# Patient Record
Sex: FEMALE | Race: WHITE | Employment: UNEMPLOYED | URBAN - METROPOLITAN AREA
[De-identification: names, ages, dates, MRNs, and addresses within clinical notes are randomized per-mention and may not be internally consistent; named-entity substitution may affect disease eponyms.]

---

## 2017-11-11 RX ORDER — NORGESTREL AND ETHINYL ESTRADIOL 0.3-0.03MG
1 KIT ORAL DAILY
Qty: 28 TABLET | Refills: 0 | OUTPATIENT
Start: 2017-11-11

## 2017-11-11 NOTE — TELEPHONE ENCOUNTER
LAST ANNUAL WITH JOVAN ON 11-14-16, LAST PAP 3-19-14 AND LAST MAMMO 12-9-15. PT DID NOT DO MAMMO LAST YEAR. NEXT ANNUAL SCHEDULED WITH CAP ON 1-23-18. SENT BACK RX DENIED, HAVE PT CALL THE OFFICE.

## 2017-11-21 NOTE — TELEPHONE ENCOUNTER
LAST ANNUAL WITH JOVAN ON 11-14-16, LAST PAP 3-19-14, LAST MAMMO 12-9-15 (DID NOT DO MAMMO ORDERED AT LAST EXAM) AND NEXT ANNUAL IS SCHEDULED 1-23-18. RX PENDED TO [facility]AN FOR 3 TOTAL REFILLS SINCE PT DID NOT DO MAMMO.

## 2017-11-24 RX ORDER — NORGESTREL AND ETHINYL ESTRADIOL 0.3-0.03MG
1 KIT ORAL DAILY
Qty: 28 TABLET | Refills: 2 | Status: SHIPPED | OUTPATIENT
Start: 2017-11-24 | End: 2018-01-23

## 2018-01-23 ENCOUNTER — OFFICE VISIT (OUTPATIENT)
Dept: OBGYN CLINIC | Facility: CLINIC | Age: 43
End: 2018-01-23

## 2018-01-23 VITALS
DIASTOLIC BLOOD PRESSURE: 82 MMHG | HEART RATE: 84 BPM | BODY MASS INDEX: 46.44 KG/M2 | SYSTOLIC BLOOD PRESSURE: 135 MMHG | WEIGHT: 246 LBS | HEIGHT: 61 IN

## 2018-01-23 DIAGNOSIS — N39.498 OTHER URINARY INCONTINENCE: ICD-10-CM

## 2018-01-23 DIAGNOSIS — Z12.31 VISIT FOR SCREENING MAMMOGRAM: ICD-10-CM

## 2018-01-23 DIAGNOSIS — Z01.419 ENCOUNTER FOR GYNECOLOGICAL EXAMINATION WITHOUT ABNORMAL FINDING: Primary | ICD-10-CM

## 2018-01-23 PROCEDURE — 99213 OFFICE O/P EST LOW 20 MIN: CPT | Performed by: OBSTETRICS & GYNECOLOGY

## 2018-01-23 PROCEDURE — 99396 PREV VISIT EST AGE 40-64: CPT | Performed by: OBSTETRICS & GYNECOLOGY

## 2018-01-23 NOTE — PROGRESS NOTES
Paramjit Cain is a 43year old female [id number] Patient's last menstrual period was 01/08/2018. Patient presents with:  Gyn Exam: ANNUAL EXAM / Meena Burgess  pt c/o spontaneous leakage of urine as she moves around. She soaks 2 pads per day.   I rec avoiding status: Never Smoker    Smokeless tobacco: Never Used    Alcohol use Yes  0.0 oz/week     Comment: wine, socially    Drug use: No    Comment: NONE    Sexual activity: Yes    Birth control/ protection: OCP     Other Topics Concern    Caffeine Concern Yes or bleeding.       PHYSICAL EXAM:   Constitutional: well developed, well nourished  Head/Face: normocephalic  Neck/Thyroid: thyroid symmetric, no thyromegaly, no nodules, no adenopathy  Lymphatic:no abnormal supraclavicular or axillary adenopathy is noted

## 2018-01-24 LAB — HPV I/H RISK 1 DNA SPEC QL NAA+PROBE: NEGATIVE

## 2018-04-05 ENCOUNTER — OFFICE VISIT (OUTPATIENT)
Dept: INTERNAL MEDICINE CLINIC | Facility: CLINIC | Age: 43
End: 2018-04-05

## 2018-04-05 VITALS
WEIGHT: 252 LBS | HEIGHT: 61 IN | HEART RATE: 65 BPM | SYSTOLIC BLOOD PRESSURE: 134 MMHG | DIASTOLIC BLOOD PRESSURE: 82 MMHG | BODY MASS INDEX: 47.58 KG/M2 | RESPIRATION RATE: 16 BRPM

## 2018-04-05 DIAGNOSIS — Z00.00 PHYSICAL EXAM, ANNUAL: Primary | ICD-10-CM

## 2018-04-05 PROCEDURE — 99396 PREV VISIT EST AGE 40-64: CPT | Performed by: INTERNAL MEDICINE

## 2018-04-05 NOTE — PROGRESS NOTES
HPI:   Reddy Gillette is a 43year old female who presents for a complete physical exam. Symptoms: periods are regular. Patient complains of nothing.       Immunization History  Administered            Date(s) Administered    Influenza             11/19/20 Maternal Uncle       Social History:   Smoking status: Never Smoker                                                              Smokeless tobacco: Never Used                      Alcohol use: Yes           0.0 oz/week     Comment: wine, socially    Occ: h complete physical exam.  Order put in for mammogram and dexascan. Self breast exam explained. Health maintenance, will check fasting Lipids, CMP, TSH and CBC. Pt referred for screening colonoscopy at 47 y/o.  Pt info: exercise, low fat diet and breast self-

## 2019-01-14 ENCOUNTER — TELEPHONE (OUTPATIENT)
Dept: OBGYN CLINIC | Facility: CLINIC | Age: 44
End: 2019-01-14

## 2019-01-14 NOTE — TELEPHONE ENCOUNTER
Pt ran out of [facility] was supposed to start yesterday.  Pt wants to get one month BC has an apt with CAP 1/31

## 2019-01-14 NOTE — TELEPHONE ENCOUNTER
1 pack of OCP sent to pts pharmacy per protocol to cover pt until her annual. Pt verbalized understanding.

## 2019-02-12 ENCOUNTER — OFFICE VISIT (OUTPATIENT)
Dept: OBGYN CLINIC | Facility: CLINIC | Age: 44
End: 2019-02-12
Payer: COMMERCIAL

## 2019-02-12 VITALS
BODY MASS INDEX: 48 KG/M2 | HEART RATE: 92 BPM | WEIGHT: 254 LBS | DIASTOLIC BLOOD PRESSURE: 85 MMHG | SYSTOLIC BLOOD PRESSURE: 159 MMHG

## 2019-02-12 DIAGNOSIS — Z01.419 ENCOUNTER FOR GYNECOLOGICAL EXAMINATION WITHOUT ABNORMAL FINDING: Primary | ICD-10-CM

## 2019-02-12 DIAGNOSIS — Z12.31 VISIT FOR SCREENING MAMMOGRAM: ICD-10-CM

## 2019-02-12 DIAGNOSIS — R03.0 ELEVATED BLOOD-PRESSURE READING WITHOUT DIAGNOSIS OF HYPERTENSION: ICD-10-CM

## 2019-02-12 DIAGNOSIS — N39.3 STRESS INCONTINENCE OF URINE: ICD-10-CM

## 2019-02-12 PROCEDURE — 99396 PREV VISIT EST AGE 40-64: CPT | Performed by: OBSTETRICS & GYNECOLOGY

## 2019-02-12 NOTE — PROGRESS NOTES
Ricky Rojo is a 37year old female [id number] Patient's last menstrual period was 02/08/2019. Patient presents with:  Gyn Exam: ANNUAL    She did not go to see gyne urology as recommended last year for incontinence- she asked for the card again today. and Sexual Activity      Alcohol use:  Yes        Alcohol/week: 0.0 oz        Comment: wine, socially      Drug use: No        Comment: NONE      Sexual activity: Yes        Birth control/protection: OCP    Other Topics      Concerns:        Selestino Schwab denies chest pain or palpitations  Respiratory:  denies shortness of breath  Gastrointestinal:  denies heartburn, abdominal pain, diarrhea or constipation  Genitourinary:  denies dysuria, incontinence, abnormal vaginal discharge, vaginal itching  Musculosk diagnosis of hypertension  Visit for screening mammogram  ASCCP guidelines discussed,cotest done 2018-neg - repeat in 3 years ,rtc 1 year for annual exam    No orders of the defined types were placed in this encounter.       Requested Prescriptions      No

## 2019-02-15 ENCOUNTER — WALK IN (OUTPATIENT)
Dept: URGENT CARE | Age: 44
End: 2019-02-15

## 2019-02-15 VITALS
TEMPERATURE: 97.3 F | RESPIRATION RATE: 17 BRPM | OXYGEN SATURATION: 96 % | HEART RATE: 82 BPM | DIASTOLIC BLOOD PRESSURE: 88 MMHG | BODY MASS INDEX: 46.01 KG/M2 | HEIGHT: 62 IN | WEIGHT: 250 LBS | SYSTOLIC BLOOD PRESSURE: 132 MMHG

## 2019-02-15 DIAGNOSIS — N30.00 ACUTE CYSTITIS WITHOUT HEMATURIA: Primary | ICD-10-CM

## 2019-02-15 LAB
APPEARANCE, POC: ABNORMAL
BILIRUBIN, POC: NEGATIVE
COLOR, POC: ABNORMAL
GLUCOSE UR-MCNC: NEGATIVE MG/DL
KETONES, POC: NEGATIVE
NITRITE, POC: NEGATIVE
OCCULT BLOOD, POC: ABNORMAL
PH UR: 6 [PH] (ref 5–7)
PROT UR-MCNC: NEGATIVE G/DL
SP GR UR: 1.02 (ref 1–1.03)
UROBILINOGEN UR-MCNC: 0.2 MG/DL (ref 0–1)
WBC (LEUKOCYTE) ESTERASE, POC: ABNORMAL

## 2019-02-15 PROCEDURE — 81002 URINALYSIS NONAUTO W/O SCOPE: CPT | Performed by: NURSE PRACTITIONER

## 2019-02-15 PROCEDURE — 99203 OFFICE O/P NEW LOW 30 MIN: CPT | Performed by: NURSE PRACTITIONER

## 2019-02-15 RX ORDER — NITROFURANTOIN 25; 75 MG/1; MG/1
100 CAPSULE ORAL 2 TIMES DAILY
Qty: 10 CAPSULE | Refills: 0 | Status: SHIPPED | OUTPATIENT
Start: 2019-02-15 | End: 2019-02-20

## 2019-02-15 RX ORDER — FLUCONAZOLE 150 MG/1
150 TABLET ORAL ONCE
Qty: 1 TABLET | Refills: 0 | Status: SHIPPED | OUTPATIENT
Start: 2019-02-15 | End: 2019-02-15

## 2019-04-08 RX ORDER — NORGESTREL AND ETHINYL ESTRADIOL 0.3-0.03MG
KIT ORAL
Qty: 84 TABLET | Refills: 0 | OUTPATIENT
Start: 2019-04-08

## 2019-05-01 ENCOUNTER — HOSPITAL ENCOUNTER (OUTPATIENT)
Dept: MAMMOGRAPHY | Facility: HOSPITAL | Age: 44
Discharge: HOME | End: 2019-05-01
Attending: OBSTETRICS & GYNECOLOGY
Payer: COMMERCIAL

## 2019-05-01 DIAGNOSIS — Z12.31 VISIT FOR SCREENING MAMMOGRAM: ICD-10-CM

## 2019-05-01 PROCEDURE — 77067 SCR MAMMO BI INCL CAD: CPT | Performed by: OBSTETRICS & GYNECOLOGY

## 2019-05-01 PROCEDURE — 77063 BREAST TOMOSYNTHESIS BI: CPT | Performed by: OBSTETRICS & GYNECOLOGY

## 2019-05-06 NOTE — TELEPHONE ENCOUNTER
HAD ANNUAL EXAM 2-12-19, LAST PAP 1-23-18 AND LAST MAMMO 5-1-19. RX WAS NOT GIVEN AT [facility]. RX PENDED TO [facility] FOR REFILLS TIL FEB. 2020. PER PT, SHE WAS SUPPOSED TO START A PILL PACK YESTERDAY.

## 2019-05-06 NOTE — TELEPHONE ENCOUNTER
Pt requesting refill be sent to pharmacy, states she was seen in February and has been calling every month to get refill.

## 2019-05-07 NOTE — TELEPHONE ENCOUNTER
MESSAGE REVIEWED WITH CAP IN THE OFFICE. DUE TO PT'S BLOOD PRESSURE AT APPT IN FEB. SHE WILL NOT REFILL OC'S AT THIS TIME. PT NEEDS TO ADDRESS HER BP WITH HER PCP. PT NOTIFIED OF THIS INFO.   PT WAS CERTAIN CAP TOLD HER SHE WOULD REFILL BUT PT NEEDED TO

## 2019-05-09 ENCOUNTER — OFFICE VISIT (OUTPATIENT)
Dept: INTERNAL MEDICINE CLINIC | Facility: CLINIC | Age: 44
End: 2019-05-09
Payer: COMMERCIAL

## 2019-05-09 ENCOUNTER — TELEPHONE (OUTPATIENT)
Dept: INTERNAL MEDICINE CLINIC | Facility: CLINIC | Age: 44
End: 2019-05-09

## 2019-05-09 VITALS
SYSTOLIC BLOOD PRESSURE: 150 MMHG | DIASTOLIC BLOOD PRESSURE: 90 MMHG | TEMPERATURE: 98 F | BODY MASS INDEX: 48.27 KG/M2 | HEIGHT: 61 IN | HEART RATE: 92 BPM | WEIGHT: 255.69 LBS

## 2019-05-09 DIAGNOSIS — R53.83 FATIGUE, UNSPECIFIED TYPE: ICD-10-CM

## 2019-05-09 DIAGNOSIS — E66.01 MORBID OBESITY WITH BMI OF 45.0-49.9, ADULT (HCC): ICD-10-CM

## 2019-05-09 DIAGNOSIS — I10 ESSENTIAL HYPERTENSION: Primary | ICD-10-CM

## 2019-05-09 DIAGNOSIS — Z00.00 PHYSICAL EXAM, ANNUAL: Primary | ICD-10-CM

## 2019-05-09 DIAGNOSIS — E55.9 VITAMIN D DEFICIENCY: ICD-10-CM

## 2019-05-09 PROCEDURE — 99214 OFFICE O/P EST MOD 30 MIN: CPT | Performed by: INTERNAL MEDICINE

## 2019-05-09 PROCEDURE — 99212 OFFICE O/P EST SF 10 MIN: CPT | Performed by: INTERNAL MEDICINE

## 2019-05-09 RX ORDER — AMLODIPINE BESYLATE 2.5 MG/1
2.5 TABLET ORAL DAILY
Qty: 30 TABLET | Refills: 1 | Status: SHIPPED | OUTPATIENT
Start: 2019-05-09 | End: 2019-08-12

## 2019-05-09 NOTE — ASSESSMENT & PLAN NOTE
Vitamin D level of 22 in 2016. Advised start taking over-the-counter vitamin D3 supplements 2000 units daily. Ordered labs.

## 2019-05-09 NOTE — PROGRESS NOTES
Juan Carlos Vivas is a 37year old female. Patient presents with:  Blood Pressure      HPI:     HPI  Patient is here for elevated BP. Was seen by her OB/GYN-Dr. leone in February. Her blood pressure at that time was 159/85.   Her blood pressure today is 150 Alcohol use: Yes      Alcohol/week: 0.0 oz      Comment: wine, socially    Drug use: No      Comment: NONE       REVIEW OF SYSTEMS:   Review of Systems   Constitutional: Positive for malaise/fatigue. Weight gain   Eyes: Negative for blurred vision. blood pressure and obesity. Patient defers testing now, wants to try lifestyle modification and weight loss first.  Complications of untreated hypertension explained especially heart failure, organ damage, increased risk of heart attack and stroke.   Patie with the patient and she is motivated to try hard to lose weight. Relevant Orders    DIETITIAN EDUCATION INITIAL, DIET (INTERNAL)        rtc in 3 w for physical        The patient indicates understanding of these issues and agrees to the plan.

## 2019-05-09 NOTE — ASSESSMENT & PLAN NOTE
Most likely due to multifactorial etiology. Possible underlying sleep apnea-(patient defers testing currently), vitamin D deficiency, possible underlying depression, weight gain, inactivity. Ordered basic labs.   Advised the patient to be more active and

## 2019-05-09 NOTE — ASSESSMENT & PLAN NOTE
BMI of 48. Patient not interested in medication weight loss. Likes to do it more naturally. Referral for dietitian given. Motivated to lose weight. Importance of weight loss discussed. Increased risk of sleep apnea with morbid obesity discussed.   Virgei Angelo

## 2019-05-09 NOTE — ASSESSMENT & PLAN NOTE
Blood pressure elevated in 2 separate occasions in 907J of systolic and 90 of diastolic. Lifestyle modification discussed. Counseled on weight loss, low-salt diet, regular exercise at least 30 minutes of aerobic exercise 5 times a week.   Ne rivas

## 2019-06-20 ENCOUNTER — WALK IN (OUTPATIENT)
Dept: URGENT CARE | Age: 44
End: 2019-06-20

## 2019-06-20 ENCOUNTER — TELEPHONE (OUTPATIENT)
Dept: SCHEDULING | Age: 44
End: 2019-06-20

## 2019-06-20 VITALS
SYSTOLIC BLOOD PRESSURE: 118 MMHG | HEIGHT: 61 IN | RESPIRATION RATE: 16 BRPM | TEMPERATURE: 98 F | HEART RATE: 74 BPM | DIASTOLIC BLOOD PRESSURE: 84 MMHG | OXYGEN SATURATION: 98 % | BODY MASS INDEX: 47.2 KG/M2 | WEIGHT: 250 LBS

## 2019-06-20 DIAGNOSIS — N30.01 ACUTE CYSTITIS WITH HEMATURIA: ICD-10-CM

## 2019-06-20 DIAGNOSIS — R39.89 SENSATION OF PRESSURE IN BLADDER AREA: Primary | ICD-10-CM

## 2019-06-20 LAB
APPEARANCE, POC: NORMAL
BILIRUBIN, POC: NEGATIVE
COLOR, POC: YELLOW
GLUCOSE UR-MCNC: NEGATIVE MG/DL
KETONES, POC: NEGATIVE
NITRITE, POC: NEGATIVE
OCCULT BLOOD, POC: NORMAL
PH UR: 6 [PH] (ref 5–7)
PROT UR-MCNC: NEGATIVE G/DL
SP GR UR: 1.02 (ref 1–1.03)
UROBILINOGEN UR-MCNC: 0.2 MG/DL (ref 0–1)
WBC (LEUKOCYTE) ESTERASE, POC: NORMAL

## 2019-06-20 PROCEDURE — 81002 URINALYSIS NONAUTO W/O SCOPE: CPT | Performed by: NURSE PRACTITIONER

## 2019-06-20 PROCEDURE — 99214 OFFICE O/P EST MOD 30 MIN: CPT | Performed by: NURSE PRACTITIONER

## 2019-06-20 RX ORDER — AMLODIPINE BESYLATE 2.5 MG/1
2.5 TABLET ORAL
COMMUNITY
Start: 2019-05-09

## 2019-06-20 RX ORDER — NITROFURANTOIN 25; 75 MG/1; MG/1
100 CAPSULE ORAL 2 TIMES DAILY
Qty: 10 CAPSULE | Refills: 0 | Status: SHIPPED | OUTPATIENT
Start: 2019-06-20

## 2019-08-12 RX ORDER — AMLODIPINE BESYLATE 2.5 MG/1
2.5 TABLET ORAL DAILY
Qty: 30 TABLET | Refills: 1 | Status: SHIPPED | OUTPATIENT
Start: 2019-08-12 | End: 2020-08-19

## 2019-08-12 NOTE — TELEPHONE ENCOUNTER
Please review; protocol failed. No recent CMP on file.       Per 5/9/19 Amlodipine started by Dr Araceli Santos with pt who states \"I didn't get a chance to f/u with Dr Fatoumata Jones, but I have an OV scheduled with Dr Roddy Zabala 8/29/19 to f/u\"    Pt states she 0.4 03/03/2016    TP 7.4 03/03/2016    ALB 3.6 03/03/2016     03/03/2016    K 4.6 03/03/2016     03/03/2016    CO2 27 03/03/2016    GLOBULIN 3.8 (H) 03/03/2016    AGRATIO 0.9 (L) 03/03/2016    ANIONGAP 7 03/03/2016    OSMOCALC 289 03/03/2016

## 2019-08-12 NOTE — TELEPHONE ENCOUNTER
Pt called in requesting refill on medication below. She states that she has been out of it for about 1 month. She would like to get back on the medication and then come in for a follow up with Dr. Joni Perez. Please advise.      Current Outpatient Medications:

## 2019-08-23 ENCOUNTER — LAB ENCOUNTER (OUTPATIENT)
Dept: LAB | Facility: HOSPITAL | Age: 44
End: 2019-08-23
Attending: INTERNAL MEDICINE
Payer: COMMERCIAL

## 2019-08-23 DIAGNOSIS — Z00.00 PHYSICAL EXAM, ANNUAL: ICD-10-CM

## 2019-08-23 LAB
25(OH)D3 SERPL-MCNC: 22.6 NG/ML (ref 30–100)
ALBUMIN SERPL-MCNC: 3.5 G/DL (ref 3.4–5)
ALBUMIN/GLOB SERPL: 0.9 {RATIO} (ref 1–2)
ALP LIVER SERPL-CCNC: 94 U/L (ref 37–98)
ALT SERPL-CCNC: 52 U/L (ref 13–56)
ANION GAP SERPL CALC-SCNC: 8 MMOL/L (ref 0–18)
AST SERPL-CCNC: 30 U/L (ref 15–37)
BASOPHILS # BLD AUTO: 0.09 X10(3) UL (ref 0–0.2)
BASOPHILS NFR BLD AUTO: 1.2 %
BILIRUB SERPL-MCNC: 0.5 MG/DL (ref 0.1–2)
BUN BLD-MCNC: 12 MG/DL (ref 7–18)
BUN/CREAT SERPL: 16.2 (ref 10–20)
CALCIUM BLD-MCNC: 9 MG/DL (ref 8.5–10.1)
CHLORIDE SERPL-SCNC: 106 MMOL/L (ref 98–112)
CHOLEST SMN-MCNC: 174 MG/DL (ref ?–200)
CO2 SERPL-SCNC: 28 MMOL/L (ref 21–32)
CREAT BLD-MCNC: 0.74 MG/DL (ref 0.55–1.02)
DEPRECATED RDW RBC AUTO: 43.4 FL (ref 35.1–46.3)
EOSINOPHIL # BLD AUTO: 0.22 X10(3) UL (ref 0–0.7)
EOSINOPHIL NFR BLD AUTO: 2.9 %
ERYTHROCYTE [DISTWIDTH] IN BLOOD BY AUTOMATED COUNT: 13.3 % (ref 11–15)
GLOBULIN PLAS-MCNC: 4 G/DL (ref 2.8–4.4)
GLUCOSE BLD-MCNC: 92 MG/DL (ref 70–99)
HCT VFR BLD AUTO: 38.4 % (ref 35–48)
HDLC SERPL-MCNC: 45 MG/DL (ref 40–59)
HGB BLD-MCNC: 12.6 G/DL (ref 12–16)
IMM GRANULOCYTES # BLD AUTO: 0.02 X10(3) UL (ref 0–1)
IMM GRANULOCYTES NFR BLD: 0.3 %
LDLC SERPL CALC-MCNC: 105 MG/DL (ref ?–100)
LYMPHOCYTES # BLD AUTO: 2.49 X10(3) UL (ref 1–4)
LYMPHOCYTES NFR BLD AUTO: 33.3 %
M PROTEIN MFR SERPL ELPH: 7.5 G/DL (ref 6.4–8.2)
MCH RBC QN AUTO: 29.4 PG (ref 26–34)
MCHC RBC AUTO-ENTMCNC: 32.8 G/DL (ref 31–37)
MCV RBC AUTO: 89.5 FL (ref 80–100)
MONOCYTES # BLD AUTO: 0.61 X10(3) UL (ref 0.1–1)
MONOCYTES NFR BLD AUTO: 8.2 %
NEUTROPHILS # BLD AUTO: 4.05 X10 (3) UL (ref 1.5–7.7)
NEUTROPHILS # BLD AUTO: 4.05 X10(3) UL (ref 1.5–7.7)
NEUTROPHILS NFR BLD AUTO: 54.1 %
NONHDLC SERPL-MCNC: 129 MG/DL (ref ?–130)
OSMOLALITY SERPL CALC.SUM OF ELEC: 293 MOSM/KG (ref 275–295)
PATIENT FASTING: YES
PATIENT FASTING: YES
PLATELET # BLD AUTO: 342 10(3)UL (ref 150–450)
POTASSIUM SERPL-SCNC: 4.1 MMOL/L (ref 3.5–5.1)
RBC # BLD AUTO: 4.29 X10(6)UL (ref 3.8–5.3)
SODIUM SERPL-SCNC: 142 MMOL/L (ref 136–145)
TRIGL SERPL-MCNC: 119 MG/DL (ref 30–149)
TSI SER-ACNC: 2.28 MIU/ML (ref 0.36–3.74)
VLDLC SERPL CALC-MCNC: 24 MG/DL (ref 0–30)
WBC # BLD AUTO: 7.5 X10(3) UL (ref 4–11)

## 2019-08-23 PROCEDURE — 85025 COMPLETE CBC W/AUTO DIFF WBC: CPT

## 2019-08-23 PROCEDURE — 80053 COMPREHEN METABOLIC PANEL: CPT

## 2019-08-23 PROCEDURE — 82306 VITAMIN D 25 HYDROXY: CPT

## 2019-08-23 PROCEDURE — 36415 COLL VENOUS BLD VENIPUNCTURE: CPT

## 2019-08-23 PROCEDURE — 80061 LIPID PANEL: CPT

## 2019-08-23 PROCEDURE — 84443 ASSAY THYROID STIM HORMONE: CPT

## 2019-08-29 ENCOUNTER — OFFICE VISIT (OUTPATIENT)
Dept: INTERNAL MEDICINE CLINIC | Facility: CLINIC | Age: 44
End: 2019-08-29
Payer: COMMERCIAL

## 2019-08-29 VITALS
DIASTOLIC BLOOD PRESSURE: 84 MMHG | WEIGHT: 262 LBS | HEART RATE: 80 BPM | SYSTOLIC BLOOD PRESSURE: 143 MMHG | BODY MASS INDEX: 49.47 KG/M2 | HEIGHT: 61 IN | RESPIRATION RATE: 16 BRPM

## 2019-08-29 DIAGNOSIS — Z00.00 PHYSICAL EXAM, ANNUAL: Primary | ICD-10-CM

## 2019-08-29 DIAGNOSIS — R06.83 SNORING: ICD-10-CM

## 2019-08-29 PROCEDURE — 99396 PREV VISIT EST AGE 40-64: CPT | Performed by: INTERNAL MEDICINE

## 2019-08-29 NOTE — PROGRESS NOTES
HPI:   Tree Coker is a 40year old female who presents for a complete physical exam. Symptoms: periods are regular. Patient complains of nothing.       Immunization History  Administered            Date(s) Administered    Influenza             11/19/20 Maternal Grandfather    • Heart Disease Maternal Grandfather         CAD   • Hypertension Sister    • Obesity Sister    • Hypertension Brother    • Hypertension Brother    • Asthma Sister    • Other (Other) Brother         gout   • Cancer Paternal Latrice Dillon edema/ spider veins on legs. NEURO: Oriented times three,cranial nerves are intact,motor and sensory are grossly intact    ASSESSMENT AND PLAN:   1.  Physical exam, annual    Alfredo Devries is a 43year old female who presents for a complete physical exam

## 2019-10-04 ENCOUNTER — TELEPHONE (OUTPATIENT)
Dept: INTERNAL MEDICINE CLINIC | Facility: CLINIC | Age: 44
End: 2019-10-04

## 2019-10-04 NOTE — TELEPHONE ENCOUNTER
Patient states started first started taking Amlodipine in May but \"not on a regular basis. \" When patient was on med she developed cough, sob and pressure on her chest.  Once off of med patient stated \"I felt so much better. \"    Patient states she does

## 2019-10-04 NOTE — TELEPHONE ENCOUNTER
Patient stated she stopped taking amLODIPine Besylate 2.5 MG Oral Tab (3-4 weeks ago) because she was feeling the side effects (shortness of breath and heavy wheezing). Patient wants to know if she needs to take another high blood pressure medication.

## 2019-10-07 NOTE — TELEPHONE ENCOUNTER
Pt was called and inform of Dr. Bliss [place] message below and she verbalized understanding. She will try to get her sister b/p machine.

## 2019-10-10 ENCOUNTER — OFFICE VISIT (OUTPATIENT)
Dept: SLEEP CENTER | Age: 44
End: 2019-10-10
Attending: INTERNAL MEDICINE
Payer: COMMERCIAL

## 2019-10-10 PROCEDURE — 95806 SLEEP STUDY UNATT&RESP EFFT: CPT

## 2019-11-30 ENCOUNTER — WALK IN (OUTPATIENT)
Dept: URGENT CARE | Age: 44
End: 2019-11-30

## 2019-11-30 DIAGNOSIS — N30.00 ACUTE CYSTITIS WITHOUT HEMATURIA: Primary | ICD-10-CM

## 2019-11-30 LAB
APPEARANCE, POC: CLEAR
BILIRUBIN, POC: NEGATIVE
COLOR, POC: YELLOW
GLUCOSE UR-MCNC: NEGATIVE MG/DL
KETONES, POC: NEGATIVE
NITRITE, POC: NEGATIVE
OCCULT BLOOD, POC: NEGATIVE
PH UR: 6.5 [PH] (ref 5–7)
PROT UR-MCNC: NEGATIVE G/DL
SP GR UR: 1.01 (ref 1–1.03)
UROBILINOGEN UR-MCNC: 0.2 MG/DL (ref 0–1)
WBC (LEUKOCYTE) ESTERASE, POC: NORMAL

## 2019-11-30 PROCEDURE — 81002 URINALYSIS NONAUTO W/O SCOPE: CPT | Performed by: NURSE PRACTITIONER

## 2019-11-30 PROCEDURE — 99202 OFFICE O/P NEW SF 15 MIN: CPT | Performed by: NURSE PRACTITIONER

## 2019-11-30 PROCEDURE — 87086 URINE CULTURE/COLONY COUNT: CPT | Performed by: NURSE PRACTITIONER

## 2019-11-30 RX ORDER — NITROFURANTOIN 25; 75 MG/1; MG/1
100 CAPSULE ORAL 2 TIMES DAILY
Qty: 10 CAPSULE | Refills: 0 | Status: SHIPPED | OUTPATIENT
Start: 2019-11-30 | End: 2019-12-05

## 2019-11-30 ASSESSMENT — ENCOUNTER SYMPTOMS
ABDOMINAL PAIN: 0
EYE REDNESS: 0
DIARRHEA: 0
FEVER: 0
BACK PAIN: 0
NAUSEA: 0
VOMITING: 0

## 2019-11-30 ASSESSMENT — PAIN SCALES - GENERAL: PAINLEVEL: 5-6

## 2019-12-02 LAB
BACTERIA UR CULT: NORMAL
REPORT STATUS (RPT): NORMAL
SPECIMEN SOURCE: NORMAL

## 2019-12-03 ENCOUNTER — TELEPHONE (OUTPATIENT)
Dept: SCHEDULING | Age: 44
End: 2019-12-03

## 2019-12-03 ENCOUNTER — TELEPHONE (OUTPATIENT)
Dept: URGENT CARE | Age: 44
End: 2019-12-03

## 2020-07-18 ENCOUNTER — TELEPHONE (OUTPATIENT)
Dept: INTERNAL MEDICINE CLINIC | Facility: CLINIC | Age: 45
End: 2020-07-18

## 2020-07-18 NOTE — TELEPHONE ENCOUNTER
Patient states her allergies have been acting up for past few months and still has cough, stuffy nose and wheezing. Denies difficulty breathing or chest pain. Patient speaking in complete sentences on phone call.     Patient would also like to discuss her

## 2020-07-20 ENCOUNTER — TELEMEDICINE (OUTPATIENT)
Dept: INTERNAL MEDICINE CLINIC | Facility: CLINIC | Age: 45
End: 2020-07-20

## 2020-07-20 DIAGNOSIS — E55.9 VITAMIN D DEFICIENCY: ICD-10-CM

## 2020-07-20 DIAGNOSIS — I10 ESSENTIAL HYPERTENSION WITH GOAL BLOOD PRESSURE LESS THAN 140/90: Primary | ICD-10-CM

## 2020-07-20 PROCEDURE — 99214 OFFICE O/P EST MOD 30 MIN: CPT | Performed by: INTERNAL MEDICINE

## 2020-08-17 ENCOUNTER — NURSE TRIAGE (OUTPATIENT)
Dept: INTERNAL MEDICINE CLINIC | Facility: CLINIC | Age: 45
End: 2020-08-17

## 2020-08-17 NOTE — TELEPHONE ENCOUNTER
----- Message from Maddy Iyer sent at 8/17/2020  2:23 PM CDT -----  Regarding: Other  Contact: [phone]  April Lion,    Sorry I didn’t get back to you sooner regarding my blood pressure and I didn’t do it everyday but I do have 5 days wo

## 2020-08-18 NOTE — TELEPHONE ENCOUNTER
Pt was called and she was informed of Farida message below. Telephone visit was made for tomorrow.       Future Appointments   Date Time Provider Ronald Bravo   8/19/2020  4:10 PM Jenna Carter MD [facility] Rosamaria Agudelo

## 2020-08-19 ENCOUNTER — VIRTUAL PHONE E/M (OUTPATIENT)
Dept: INTERNAL MEDICINE CLINIC | Facility: CLINIC | Age: 45
End: 2020-08-19
Payer: COMMERCIAL

## 2020-08-19 DIAGNOSIS — I10 ESSENTIAL HYPERTENSION WITH GOAL BLOOD PRESSURE LESS THAN 140/90: Primary | ICD-10-CM

## 2020-08-19 PROCEDURE — 99214 OFFICE O/P EST MOD 30 MIN: CPT | Performed by: INTERNAL MEDICINE

## 2020-08-19 RX ORDER — AMLODIPINE BESYLATE 2.5 MG/1
2.5 TABLET ORAL DAILY
Qty: 90 TABLET | Refills: 1 | Status: SHIPPED | OUTPATIENT
Start: 2020-08-19 | End: 2021-03-15

## 2020-08-20 NOTE — PROGRESS NOTES
Patient ID: Nader Villa is a 39year old female. No chief complaint on file. Virtual/Telephone Check-In    Nader Villa verbally consents to a Virtual/Telephone Check-In service on 08/20/20.  Patient understands and accepts financial responsib activity: Yes        Birth control/protection: OCP    Lifestyle      Physical activity:        Days per week: Not on file        Minutes per session: Not on file      Stress: Not on file    Relationships      Social connections:        Talks on phone: Not minutes   Telephone time 11 minutes   Documentation time 3 minutes     Gina Lara understands phone evaluation is not a substitute for face-to-face examination or emergency care.  Patient advised to go to ER or call 911 for worsening symptoms or acute

## 2020-09-17 ENCOUNTER — TELEPHONE (OUTPATIENT)
Dept: INTERNAL MEDICINE CLINIC | Facility: CLINIC | Age: 45
End: 2020-09-17

## 2020-09-17 DIAGNOSIS — Z00.00 PHYSICAL EXAM, ANNUAL: Primary | ICD-10-CM

## 2020-09-17 NOTE — TELEPHONE ENCOUNTER
Patient scheduled physical for 10/28. Patient is requesting blood work orders. Please advise.  Thank you

## 2020-09-18 NOTE — TELEPHONE ENCOUNTER
Spoke to Pt informed lab orders were placed and advised to schedule a lab appointment. Pt verbalized understanding denied questions .

## 2020-09-24 ENCOUNTER — LAB ENCOUNTER (OUTPATIENT)
Dept: LAB | Facility: HOSPITAL | Age: 45
End: 2020-09-24
Attending: INTERNAL MEDICINE
Payer: COMMERCIAL

## 2020-09-24 DIAGNOSIS — Z00.00 PHYSICAL EXAM, ANNUAL: ICD-10-CM

## 2020-09-24 LAB
ALBUMIN SERPL-MCNC: 3.5 G/DL (ref 3.4–5)
ALBUMIN/GLOB SERPL: 0.8 {RATIO} (ref 1–2)
ALP LIVER SERPL-CCNC: 94 U/L
ALT SERPL-CCNC: 39 U/L
ANION GAP SERPL CALC-SCNC: 4 MMOL/L (ref 0–18)
AST SERPL-CCNC: 23 U/L (ref 15–37)
BACTERIA UR QL AUTO: NEGATIVE /HPF
BASOPHILS # BLD AUTO: 0.07 X10(3) UL (ref 0–0.2)
BASOPHILS NFR BLD AUTO: 0.8 %
BILIRUB SERPL-MCNC: 0.5 MG/DL (ref 0.1–2)
BILIRUB UR QL: NEGATIVE
BUN BLD-MCNC: 12 MG/DL (ref 7–18)
BUN/CREAT SERPL: 15.8 (ref 10–20)
CALCIUM BLD-MCNC: 9 MG/DL (ref 8.5–10.1)
CHLORIDE SERPL-SCNC: 106 MMOL/L (ref 98–112)
CHOLEST SMN-MCNC: 171 MG/DL (ref ?–200)
CLARITY UR: CLEAR
CO2 SERPL-SCNC: 29 MMOL/L (ref 21–32)
COLOR UR: YELLOW
CREAT BLD-MCNC: 0.76 MG/DL
DEPRECATED RDW RBC AUTO: 42.7 FL (ref 35.1–46.3)
EOSINOPHIL # BLD AUTO: 0.29 X10(3) UL (ref 0–0.7)
EOSINOPHIL NFR BLD AUTO: 3.4 %
ERYTHROCYTE [DISTWIDTH] IN BLOOD BY AUTOMATED COUNT: 13.2 % (ref 11–15)
GLOBULIN PLAS-MCNC: 4.5 G/DL (ref 2.8–4.4)
GLUCOSE BLD-MCNC: 87 MG/DL (ref 70–99)
GLUCOSE UR-MCNC: NEGATIVE MG/DL
HCT VFR BLD AUTO: 39 %
HDLC SERPL-MCNC: 46 MG/DL (ref 40–59)
HGB BLD-MCNC: 13 G/DL
IMM GRANULOCYTES # BLD AUTO: 0.02 X10(3) UL (ref 0–1)
IMM GRANULOCYTES NFR BLD: 0.2 %
KETONES UR-MCNC: NEGATIVE MG/DL
LDLC SERPL CALC-MCNC: 105 MG/DL (ref ?–100)
LEUKOCYTE ESTERASE UR QL STRIP.AUTO: NEGATIVE
LYMPHOCYTES # BLD AUTO: 2.41 X10(3) UL (ref 1–4)
LYMPHOCYTES NFR BLD AUTO: 28.3 %
M PROTEIN MFR SERPL ELPH: 8 G/DL (ref 6.4–8.2)
MCH RBC QN AUTO: 29.3 PG (ref 26–34)
MCHC RBC AUTO-ENTMCNC: 33.3 G/DL (ref 31–37)
MCV RBC AUTO: 87.8 FL
MONOCYTES # BLD AUTO: 0.61 X10(3) UL (ref 0.1–1)
MONOCYTES NFR BLD AUTO: 7.2 %
NEUTROPHILS # BLD AUTO: 5.12 X10 (3) UL (ref 1.5–7.7)
NEUTROPHILS # BLD AUTO: 5.12 X10(3) UL (ref 1.5–7.7)
NEUTROPHILS NFR BLD AUTO: 60.1 %
NITRITE UR QL STRIP.AUTO: NEGATIVE
NONHDLC SERPL-MCNC: 125 MG/DL (ref ?–130)
OSMOLALITY SERPL CALC.SUM OF ELEC: 287 MOSM/KG (ref 275–295)
PATIENT FASTING Y/N/NP: YES
PATIENT FASTING Y/N/NP: YES
PH UR: 7 [PH] (ref 5–8)
PLATELET # BLD AUTO: 313 10(3)UL (ref 150–450)
POTASSIUM SERPL-SCNC: 4 MMOL/L (ref 3.5–5.1)
PROT UR-MCNC: NEGATIVE MG/DL
RBC # BLD AUTO: 4.44 X10(6)UL
RBC #/AREA URNS AUTO: <1 /HPF
SODIUM SERPL-SCNC: 139 MMOL/L (ref 136–145)
SP GR UR STRIP: 1 (ref 1–1.03)
TRIGL SERPL-MCNC: 101 MG/DL (ref 30–149)
TSI SER-ACNC: 2.35 MIU/ML (ref 0.36–3.74)
UROBILINOGEN UR STRIP-ACNC: <2
VLDLC SERPL CALC-MCNC: 20 MG/DL (ref 0–30)
WBC # BLD AUTO: 8.5 X10(3) UL (ref 4–11)
WBC #/AREA URNS AUTO: <1 /HPF

## 2020-09-24 PROCEDURE — 80061 LIPID PANEL: CPT

## 2020-09-24 PROCEDURE — 84443 ASSAY THYROID STIM HORMONE: CPT

## 2020-09-24 PROCEDURE — 80053 COMPREHEN METABOLIC PANEL: CPT

## 2020-09-24 PROCEDURE — 36415 COLL VENOUS BLD VENIPUNCTURE: CPT

## 2020-09-24 PROCEDURE — 85025 COMPLETE CBC W/AUTO DIFF WBC: CPT

## 2020-09-24 PROCEDURE — 82306 VITAMIN D 25 HYDROXY: CPT | Performed by: INTERNAL MEDICINE

## 2020-09-24 PROCEDURE — 81001 URINALYSIS AUTO W/SCOPE: CPT

## 2020-09-25 LAB — 25(OH)D3 SERPL-MCNC: 27.5 NG/ML (ref 30–100)

## 2020-09-28 ENCOUNTER — OFFICE VISIT (OUTPATIENT)
Dept: INTERNAL MEDICINE CLINIC | Facility: CLINIC | Age: 45
End: 2020-09-28
Payer: COMMERCIAL

## 2020-09-28 VITALS
WEIGHT: 272 LBS | BODY MASS INDEX: 51.35 KG/M2 | SYSTOLIC BLOOD PRESSURE: 160 MMHG | HEART RATE: 70 BPM | DIASTOLIC BLOOD PRESSURE: 12 MMHG | HEIGHT: 61 IN

## 2020-09-28 DIAGNOSIS — Z00.00 PHYSICAL EXAM, ANNUAL: Primary | ICD-10-CM

## 2020-09-28 PROCEDURE — 99396 PREV VISIT EST AGE 40-64: CPT | Performed by: INTERNAL MEDICINE

## 2020-09-28 PROCEDURE — 3008F BODY MASS INDEX DOCD: CPT | Performed by: INTERNAL MEDICINE

## 2020-09-28 PROCEDURE — 3078F DIAST BP <80 MM HG: CPT | Performed by: INTERNAL MEDICINE

## 2020-09-28 PROCEDURE — 3077F SYST BP >= 140 MM HG: CPT | Performed by: INTERNAL MEDICINE

## 2020-09-28 RX ORDER — METHYLPREDNISOLONE 4 MG/1
TABLET ORAL
Qty: 1 KIT | Refills: 0 | Status: SHIPPED | OUTPATIENT
Start: 2020-09-28 | End: 2021-05-15

## 2020-09-28 RX ORDER — MONTELUKAST SODIUM 10 MG/1
10 TABLET ORAL NIGHTLY
Qty: 30 TABLET | Refills: 3 | Status: SHIPPED | OUTPATIENT
Start: 2020-09-28 | End: 2021-03-09

## 2020-09-28 RX ORDER — ALBUTEROL SULFATE 90 UG/1
2 AEROSOL, METERED RESPIRATORY (INHALATION) EVERY 4 HOURS PRN
Qty: 1 INHALER | Refills: 6 | Status: SHIPPED | OUTPATIENT
Start: 2020-09-28 | End: 2021-09-28

## 2020-09-29 ENCOUNTER — TELEPHONE (OUTPATIENT)
Dept: OBGYN CLINIC | Facility: CLINIC | Age: 45
End: 2020-09-29

## 2020-09-29 NOTE — TELEPHONE ENCOUNTER
Pt calling to report that she has not had a period since \"sometime in [place]", pt cannot remember the exact date. Pt stated that prior to this her cycles were monthly. Pt is not on BC. Pt denies unprotected IC. Pt denies changes in diet or exercise.  Pt sta

## 2020-09-30 NOTE — TELEPHONE ENCOUNTER
Please have pt call back if she has not had her menses by the end of October and then we will do hormone levels to see if going into menopause. Usually menses have to be absent for a least 3-6 months before the hormone levels reflect a change.

## 2020-10-05 NOTE — PROGRESS NOTES
Karen Obrien is a 39year old female who presents for a complete physical exam. Symptoms: periods are regular. Patient complains of cough and some shortness of breath.       Immunization History  Administered            Date(s) Administered    Influenza angioplasty   • Carotid stenosis Father         endarterectomy   • Other (Other) Mother         overweight   • Breast Cancer Maternal Grandmother [de-identified]   • Diabetes Maternal Grandfather    • Heart Disease Maternal Grandfather         CAD   • Hypertension Sist dominant or suspicious mass  LUNGS: clear to auscultation  CARDIO: RRR without murmur  GI: good BS's,no masses, HSM or tenderness  :deferred  RECTAL:deferred  MUSCULOSKELETAL: back is not tender,FROM of the back  EXTREMITIES: no cyanosis, clubbing or demond

## 2020-10-14 NOTE — TELEPHONE ENCOUNTER
Action Requested: Summary for Provider     []  Critical Lab, Recommendations Needed  [x] Need Additional Advice  []   FYI    [x]   Need Orders  [] Need Medications Sent to Pharmacy  []  Other     SUMMARY: Patient complains of cough with wheezing mostly at Patient returning call to schedule [place] [phone].

## 2020-11-09 ENCOUNTER — TELEMEDICINE (OUTPATIENT)
Dept: FAMILY MEDICINE CLINIC | Facility: CLINIC | Age: 45
End: 2020-11-09
Payer: COMMERCIAL

## 2020-11-09 ENCOUNTER — TELEPHONE (OUTPATIENT)
Dept: INTERNAL MEDICINE CLINIC | Facility: CLINIC | Age: 45
End: 2020-11-09

## 2020-11-09 DIAGNOSIS — J39.9 UPPER RESPIRATORY DISEASE: Primary | ICD-10-CM

## 2020-11-09 PROCEDURE — 99203 OFFICE O/P NEW LOW 30 MIN: CPT | Performed by: PHYSICIAN ASSISTANT

## 2020-11-09 NOTE — TELEPHONE ENCOUNTER
Action Requested: Summary for Provider     []  Critical Lab, Recommendations Needed  [] Need Additional Advice  []   FYI    []   Need Orders  [] Need Medications Sent to Pharmacy  []  Other     SUMMARY: patient reports runny nose, about one week, runny nos

## 2020-11-10 ENCOUNTER — APPOINTMENT (OUTPATIENT)
Dept: LAB | Age: 45
End: 2020-11-10
Attending: PHYSICIAN ASSISTANT
Payer: COMMERCIAL

## 2020-11-10 DIAGNOSIS — J39.9 UPPER RESPIRATORY DISEASE: ICD-10-CM

## 2020-11-13 NOTE — PROGRESS NOTES
HPI: HPI    I spoke Kathy Corral by telephone , verified date of birth, and discussed current concerns. Onset/duration of current symptoms: 7 days.     Common COVID-19 symptoms per CDC: CDC Clinical Guidance  • Fever:     Yes []     No [x]       • C 2010   • Varicose veins        Past Surgical History:     Past Surgical History:   Procedure Laterality Date   •   (9 hr. labor); (8 hr. labor);        Family History:     Family History   Problem Relation Age of Onset   • Lipids Father Mandaen service: Not on file        Active member of club or organization: Not on file        Attends meetings of clubs or organizations: Not on file        Relationship status: Not on file      Intimate partner violence        Fear of current or ex part person, place, and time. Psychiatric: She has a normal mood and affect.        Assessment and Plan[de-identified]     Problem List Items Addressed This Visit     None      Visit Diagnoses     Upper respiratory disease    -  Primary    Relevant Orders    SARS-COV-2 BY

## 2020-12-01 ENCOUNTER — OFFICE VISIT (OUTPATIENT)
Dept: OBGYN CLINIC | Facility: CLINIC | Age: 45
End: 2020-12-01
Payer: COMMERCIAL

## 2020-12-01 VITALS
HEART RATE: 85 BPM | BODY MASS INDEX: 51 KG/M2 | WEIGHT: 269 LBS | DIASTOLIC BLOOD PRESSURE: 84 MMHG | SYSTOLIC BLOOD PRESSURE: 153 MMHG

## 2020-12-01 DIAGNOSIS — Z01.419 ENCOUNTER FOR GYNECOLOGICAL EXAMINATION WITHOUT ABNORMAL FINDING: Primary | ICD-10-CM

## 2020-12-01 DIAGNOSIS — N92.6 IRREGULAR MENSES: ICD-10-CM

## 2020-12-01 DIAGNOSIS — Z30.09 GENERAL COUNSELING AND ADVICE FOR CONTRACEPTIVE MANAGEMENT: ICD-10-CM

## 2020-12-01 DIAGNOSIS — N39.3 STRESS INCONTINENCE OF URINE: ICD-10-CM

## 2020-12-01 PROCEDURE — 99213 OFFICE O/P EST LOW 20 MIN: CPT | Performed by: OBSTETRICS & GYNECOLOGY

## 2020-12-01 PROCEDURE — 3077F SYST BP >= 140 MM HG: CPT | Performed by: OBSTETRICS & GYNECOLOGY

## 2020-12-01 PROCEDURE — 99396 PREV VISIT EST AGE 40-64: CPT | Performed by: OBSTETRICS & GYNECOLOGY

## 2020-12-01 PROCEDURE — 3079F DIAST BP 80-89 MM HG: CPT | Performed by: OBSTETRICS & GYNECOLOGY

## 2020-12-01 RX ORDER — ACETAMINOPHEN AND CODEINE PHOSPHATE 120; 12 MG/5ML; MG/5ML
0.35 SOLUTION ORAL DAILY
Qty: 3 PACKAGE | Refills: 3 | Status: SHIPPED | OUTPATIENT
Start: 2020-12-01 | End: 2021-05-15

## 2020-12-01 NOTE — PROGRESS NOTES
Dimitri Roca is a 39year old female [id number] Patient's last menstrual period was 11/10/2020.   Patient presents with:  Gyn Exam: Annual exam   .     Pt started skipping menses this past summer and when they came they were longer than usual- 7 instead of SOCIAL HISTORY:  Social History    Socioeconomic History      Marital status:       Spouse name: Not on file      Number of children: Not on file      Years of education: Not on file      Highest education level: Not on file    Occupational Self-Exams: Not Asked    Social History Narrative      Not on file      FAMILY HISTORY:  Family History   Problem Relation Age of Onset   • Lipids Father    • Heart Disease Father         premature CAD - stent   • Heart Disorder Father 62        [place]ona denies blurred or double vision  Cardiovascular:  denies chest pain or palpitations  Respiratory:  denies shortness of breath  Gastrointestinal:  denies heartburn, abdominal pain, diarrhea or constipation  Genitourinary:  denies dysuria, incontinence, abno counseling and advice for contraceptive management  Stress incontinence of urine  ASCCP guidelines discussed,cotest done 2018,rtc 1 year for annual exam  Plan as above- see urogyne  No orders of the defined types were placed in this encounter.       Request

## 2021-01-19 ENCOUNTER — HOSPITAL ENCOUNTER (OUTPATIENT)
Dept: GENERAL RADIOLOGY | Facility: HOSPITAL | Age: 46
Discharge: HOME | End: 2021-01-19
Attending: ALLERGY & IMMUNOLOGY
Payer: COMMERCIAL

## 2021-01-19 ENCOUNTER — LAB ENCOUNTER (OUTPATIENT)
Dept: LAB | Age: 46
End: 2021-01-19
Attending: ALLERGY & IMMUNOLOGY
Payer: COMMERCIAL

## 2021-01-19 ENCOUNTER — NURSE ONLY (OUTPATIENT)
Dept: ALLERGY | Facility: CLINIC | Age: 46
End: 2021-01-19
Payer: COMMERCIAL

## 2021-01-19 ENCOUNTER — OFFICE VISIT (OUTPATIENT)
Dept: ALLERGY | Facility: CLINIC | Age: 46
End: 2021-01-19
Payer: COMMERCIAL

## 2021-01-19 VITALS
SYSTOLIC BLOOD PRESSURE: 161 MMHG | OXYGEN SATURATION: 98 % | HEART RATE: 94 BPM | TEMPERATURE: 98 F | RESPIRATION RATE: 18 BRPM | DIASTOLIC BLOOD PRESSURE: 93 MMHG

## 2021-01-19 DIAGNOSIS — R05.3 CHRONIC COUGH: ICD-10-CM

## 2021-01-19 DIAGNOSIS — Z91.09 ENVIRONMENTAL ALLERGIES: ICD-10-CM

## 2021-01-19 DIAGNOSIS — R06.2 WHEEZING: ICD-10-CM

## 2021-01-19 DIAGNOSIS — R06.2 WHEEZING: Primary | ICD-10-CM

## 2021-01-19 DIAGNOSIS — J30.89 ENVIRONMENTAL AND SEASONAL ALLERGIES: ICD-10-CM

## 2021-01-19 DIAGNOSIS — J30.81 ALLERGIC RHINITIS DUE TO ANIMAL HAIR AND DANDER: ICD-10-CM

## 2021-01-19 PROCEDURE — 95004 PERQ TESTS W/ALRGNC XTRCS: CPT | Performed by: ALLERGY & IMMUNOLOGY

## 2021-01-19 PROCEDURE — 95024 IQ TESTS W/ALLERGENIC XTRCS: CPT | Performed by: ALLERGY & IMMUNOLOGY

## 2021-01-19 PROCEDURE — 71046 X-RAY EXAM CHEST 2 VIEWS: CPT | Performed by: ALLERGY & IMMUNOLOGY

## 2021-01-19 PROCEDURE — 3077F SYST BP >= 140 MM HG: CPT | Performed by: ALLERGY & IMMUNOLOGY

## 2021-01-19 PROCEDURE — 99244 OFF/OP CNSLTJ NEW/EST MOD 40: CPT | Performed by: ALLERGY & IMMUNOLOGY

## 2021-01-19 PROCEDURE — 36415 COLL VENOUS BLD VENIPUNCTURE: CPT

## 2021-01-19 PROCEDURE — 82785 ASSAY OF IGE: CPT

## 2021-01-19 PROCEDURE — 3080F DIAST BP >= 90 MM HG: CPT | Performed by: ALLERGY & IMMUNOLOGY

## 2021-01-19 RX ORDER — LEVOCETIRIZINE DIHYDROCHLORIDE 5 MG/1
TABLET, FILM COATED ORAL
Qty: 90 TABLET | Refills: 0 | OUTPATIENT
Start: 2021-01-19

## 2021-01-19 RX ORDER — LEVOCETIRIZINE DIHYDROCHLORIDE 5 MG/1
5 TABLET, FILM COATED ORAL NIGHTLY
Qty: 30 TABLET | Refills: 0 | Status: SHIPPED | OUTPATIENT
Start: 2021-01-19 | End: 2021-02-22

## 2021-01-19 RX ORDER — PREDNISONE 20 MG/1
TABLET ORAL
Qty: 10 TABLET | Refills: 0 | Status: SHIPPED | OUTPATIENT
Start: 2021-01-19 | End: 2021-04-05

## 2021-01-19 RX ORDER — FLUTICASONE PROPIONATE 50 MCG
2 SPRAY, SUSPENSION (ML) NASAL DAILY
Qty: 1 BOTTLE | Refills: 0 | Status: SHIPPED | OUTPATIENT
Start: 2021-01-19

## 2021-01-19 RX ORDER — FLUTICASONE PROPIONATE 50 MCG
SPRAY, SUSPENSION (ML) NASAL
Qty: 48 G | Refills: 0 | OUTPATIENT
Start: 2021-01-19

## 2021-01-19 NOTE — PATIENT INSTRUCTIONS
Recs:  Handouts on allergies and avoidance measures provided and reviewed including the potential treatment option of immunotherapy  Restart Singulair, montelukast milligrams once a day.   Reviewed FDA warning  Trial of Xyzal, levocetirizine 5 mg once a nig

## 2021-01-19 NOTE — PROGRESS NOTES
Tree Coker is a 39year old female. HPI:   Patient presents with:  New Patient: Referred by Dr. Dequan Benedict. Interested in environmental allergy testing. C/o wheezing, chronic cough, increased phlegm, SOB, runny nose, nasal congestion, itchy eyes.   Meds: Heart Disorder Father 62        coronary angioplasty   • Carotid stenosis Father         endarterectomy   • Other (Other) Mother         overweight   • Breast Cancer Maternal Grandmother [de-identified]   • Diabetes Maternal Grandfather    • Heart Disease Maternal [facility] [place] (Patient not taking: Reported on 12/1/2020 ) 1 kit 0       Allergies:    Azithromycin            HIVES    Comment:Other reaction(s): Hives  Pollen                      Comment:Other reaction(s): sneezing      ROS:     Allergic/Immuno:  See HPI  Cardiovascu clubbing  Neurological:Oriented to time, place, person & situation       ASSESSMENT/PLAN:   Assessment   Wheezing  (primary encounter diagnosis)  Chronic cough  Environmental allergies  Allergic rhinitis due to animal hair and dander    Patient is a 45-yea to 4 weeks to assess response to treatment         Orders This Visit:  Orders Placed This Encounter      Immunoglobulin E, Total      Meds This Visit:  Requested Prescriptions     Signed Prescriptions Disp Refills   • Levocetirizine Dihydrochloride (XYZAL)

## 2021-01-21 LAB — IGE SERPL-ACNC: 1261 KU/L (ref 2–214)

## 2021-01-22 ENCOUNTER — TELEPHONE (OUTPATIENT)
Dept: ALLERGY | Facility: CLINIC | Age: 46
End: 2021-01-22

## 2021-01-22 NOTE — TELEPHONE ENCOUNTER
RN went over results with Dr. Sami Burrows. She verbalizes understanding and has no further questions.       ----- Message from Tomas Acevedo MD sent at 1/21/2021  3:55 PM CST -----  Please call patient with IgE level of 1261.   This level is elevated however

## 2021-02-22 RX ORDER — LEVOCETIRIZINE DIHYDROCHLORIDE 5 MG/1
5 TABLET, FILM COATED ORAL NIGHTLY
Qty: 30 TABLET | Refills: 0 | Status: SHIPPED | OUTPATIENT
Start: 2021-02-22 | End: 2021-03-15

## 2021-02-22 RX ORDER — LEVOCETIRIZINE DIHYDROCHLORIDE 5 MG/1
TABLET, FILM COATED ORAL
Qty: 90 TABLET | Refills: 0 | OUTPATIENT
Start: 2021-02-22

## 2021-02-22 NOTE — TELEPHONE ENCOUNTER
Received refill request for Xyzal 5 mg- 1 tablet by mouth daily. LOV 1/19/21 for allergies. Per Dr. Huseyin Melendez progress notes to follow up in 2-4 weeks. No appointment scheduled as of today,2/22/21.     Refill pended and forwarded to Dr. Sherita Reich for fur

## 2021-02-22 NOTE — TELEPHONE ENCOUNTER
Spoke with patient, reviewed Dr. Bolivar Mehta message as below. She reports had to cancel initial f/u appt, she will plan to schedule a f/u within 1 months via [facility].

## 2021-02-22 NOTE — TELEPHONE ENCOUNTER
Refill requested for   Levocetirizine Dihydrochloride (XYZAL) 5 MG Oral Tab 30 tablet 0 2/22/2021    Sig:   Take 1 tablet (5 mg total) by mouth nightly. Route:   Oral         ACTION: Overdue for f/u, 30-day supply approved today, 90-day denied.

## 2021-03-09 RX ORDER — MONTELUKAST SODIUM 10 MG/1
TABLET ORAL
Qty: 90 TABLET | Refills: 0 | Status: SHIPPED | OUTPATIENT
Start: 2021-03-09 | End: 2021-04-05

## 2021-03-15 RX ORDER — LEVOCETIRIZINE DIHYDROCHLORIDE 5 MG/1
5 TABLET, FILM COATED ORAL NIGHTLY
Qty: 30 TABLET | Refills: 0 | Status: SHIPPED | OUTPATIENT
Start: 2021-03-15 | End: 2021-04-05

## 2021-03-15 RX ORDER — AMLODIPINE BESYLATE 2.5 MG/1
2.5 TABLET ORAL DAILY
Qty: 90 TABLET | Refills: 1 | Status: SHIPPED | OUTPATIENT
Start: 2021-03-15 | End: 2022-01-06

## 2021-03-15 RX ORDER — AMLODIPINE BESYLATE 2.5 MG/1
TABLET ORAL
Qty: 90 TABLET | Refills: 1 | Status: SHIPPED | OUTPATIENT
Start: 2021-03-15 | End: 2022-01-06

## 2021-03-17 RX ORDER — LEVOCETIRIZINE DIHYDROCHLORIDE 5 MG/1
TABLET, FILM COATED ORAL
Qty: 90 TABLET | Refills: 0 | OUTPATIENT
Start: 2021-03-17

## 2021-03-17 NOTE — TELEPHONE ENCOUNTER
90-day request denied. 30-day supply filled yesterday.   Will address additional refills at her follow-up appointment

## 2021-03-17 NOTE — TELEPHONE ENCOUNTER
30 day supply refilled yesterday. Pt with appointment next month.  Dr Soila Benitez please advise on 90 day supply

## 2021-03-17 NOTE — TELEPHONE ENCOUNTER
Spoke with Mallika Tay at [facility] regarding refill for Xyzal 90 day supply. Informed Mallika Tay, per Dr. Saima Santos refill for a 90 day supply has been denied as patient needs to have a follow up appointment.  Mallika Tay verbalizes understanding and states w

## 2021-04-05 ENCOUNTER — OFFICE VISIT (OUTPATIENT)
Dept: ALLERGY | Facility: CLINIC | Age: 46
End: 2021-04-05
Payer: COMMERCIAL

## 2021-04-05 VITALS
WEIGHT: 278 LBS | DIASTOLIC BLOOD PRESSURE: 90 MMHG | HEIGHT: 61 IN | BODY MASS INDEX: 52.49 KG/M2 | SYSTOLIC BLOOD PRESSURE: 140 MMHG | HEART RATE: 88 BPM

## 2021-04-05 DIAGNOSIS — R06.2 WHEEZING: Primary | ICD-10-CM

## 2021-04-05 DIAGNOSIS — R05.3 CHRONIC COUGH: ICD-10-CM

## 2021-04-05 DIAGNOSIS — J30.81 ALLERGIC RHINITIS DUE TO ANIMAL HAIR AND DANDER: ICD-10-CM

## 2021-04-05 DIAGNOSIS — J30.89 ENVIRONMENTAL AND SEASONAL ALLERGIES: ICD-10-CM

## 2021-04-05 PROCEDURE — 99214 OFFICE O/P EST MOD 30 MIN: CPT | Performed by: ALLERGY & IMMUNOLOGY

## 2021-04-05 PROCEDURE — 3008F BODY MASS INDEX DOCD: CPT | Performed by: ALLERGY & IMMUNOLOGY

## 2021-04-05 PROCEDURE — 3080F DIAST BP >= 90 MM HG: CPT | Performed by: ALLERGY & IMMUNOLOGY

## 2021-04-05 PROCEDURE — 3077F SYST BP >= 140 MM HG: CPT | Performed by: ALLERGY & IMMUNOLOGY

## 2021-04-05 RX ORDER — LEVOCETIRIZINE DIHYDROCHLORIDE 5 MG/1
5 TABLET, FILM COATED ORAL NIGHTLY
Qty: 90 TABLET | Refills: 1 | Status: SHIPPED | OUTPATIENT
Start: 2021-04-05 | End: 2021-11-15

## 2021-04-05 RX ORDER — MONTELUKAST SODIUM 10 MG/1
10 TABLET ORAL NIGHTLY
Qty: 90 TABLET | Refills: 0 | Status: SHIPPED | OUTPATIENT
Start: 2021-04-05 | End: 2021-06-14

## 2021-04-05 NOTE — PROGRESS NOTES
Duran Flores is a 39year old female. HPI:   Patient presents with: Allergies: pt in for f/u on allergies, mild wheezing    Patient is a 51-year-old female who presents for follow-up with a chief complaint of allergies.     Patient last seen by me in Grandfather         CAD   • Hypertension Sister    • Obesity Sister    • Other ([place]) Sister    • Hypertension Brother    • Hypertension Brother    • Asthma Sister    • Hypertension Sister    • Other (Other) Brother         gout   • Cancer Paternal Lindwood Righter ) 1 kit 0       Allergies:    Azithromycin            HIVES    Comment:Other reaction(s): Hives  Pollen                      Comment:Other reaction(s): sneezing      ROS:   Allergic/Immuno:  See hpi  Cardiovascular:  Negative for irregular heartbeat/palpit Singulair as it is providing some symptomatic relief  Trial of albuterol 2 puffs every 4-6 hours as needed if having active coughing wheezing or shortness of breath  Check peak flow readings.   Predicted peak flow for height and age to be approximately 410

## 2021-04-05 NOTE — PATIENT INSTRUCTIONS
1. Ar  Continue with Xyzal, levocetirizine 5 mg once a night at bedtime  Continue with Singulair, montelukast 10 mg once a day  Add Flonase or Nasacort 2 sprays per nostril once a day if having prominent nasal congestion  Reviewed avoidance measures and po

## 2021-04-29 RX ORDER — LEVOCETIRIZINE DIHYDROCHLORIDE 5 MG/1
TABLET, FILM COATED ORAL
Qty: 90 TABLET | Refills: 1 | OUTPATIENT
Start: 2021-04-29

## 2021-04-29 NOTE — TELEPHONE ENCOUNTER
Dr. Pola Tamayo, please deny prescription, see below. [facility] in [place] contacted, spoke with pharmacist.      Pharmacist confirms that below Rx was received and that for some reason it was closed out of patient's profile on [facility] end. Pharmacist states that they will prepare Rx for patient.

## 2021-05-15 ENCOUNTER — APPOINTMENT (OUTPATIENT)
Dept: GENERAL RADIOLOGY | Age: 46
End: 2021-05-15
Attending: NURSE PRACTITIONER
Payer: COMMERCIAL

## 2021-05-15 ENCOUNTER — NURSE TRIAGE (OUTPATIENT)
Dept: INTERNAL MEDICINE CLINIC | Facility: CLINIC | Age: 46
End: 2021-05-15

## 2021-05-15 ENCOUNTER — HOSPITAL ENCOUNTER (OUTPATIENT)
Age: 46
Discharge: HOME | End: 2021-05-15
Payer: COMMERCIAL

## 2021-05-15 VITALS
HEART RATE: 90 BPM | DIASTOLIC BLOOD PRESSURE: 92 MMHG | OXYGEN SATURATION: 98 % | SYSTOLIC BLOOD PRESSURE: 158 MMHG | RESPIRATION RATE: 16 BRPM | TEMPERATURE: 98 F

## 2021-05-15 DIAGNOSIS — R05.9 COUGH: Primary | ICD-10-CM

## 2021-05-15 PROCEDURE — U0002 COVID-19 LAB TEST NON-CDC: HCPCS | Performed by: NURSE PRACTITIONER

## 2021-05-15 PROCEDURE — 71046 X-RAY EXAM CHEST 2 VIEWS: CPT | Performed by: NURSE PRACTITIONER

## 2021-05-15 PROCEDURE — 99203 OFFICE O/P NEW LOW 30 MIN: CPT | Performed by: NURSE PRACTITIONER

## 2021-05-15 RX ORDER — PREDNISONE 20 MG/1
40 TABLET ORAL DAILY
Qty: 10 TABLET | Refills: 0 | Status: SHIPPED | OUTPATIENT
Start: 2021-05-15 | End: 2021-05-20

## 2021-05-15 RX ORDER — BIOTIN 1 MG
1 TABLET ORAL DAILY
COMMUNITY

## 2021-05-15 RX ORDER — CODEINE PHOSPHATE AND GUAIFENESIN 10; 100 MG/5ML; MG/5ML
5 SOLUTION ORAL EVERY 6 HOURS PRN
Qty: 118 ML | Refills: 0 | Status: SHIPPED | OUTPATIENT
Start: 2021-05-15 | End: 2021-05-20

## 2021-05-15 NOTE — ED PROVIDER NOTES
Patient Seen in: [facility]      History   Patient presents with:  Difficulty Breathing    Stated Complaint: cough    HPI/Subjective:   HPI    Cough with wheezing that started approximately 1 week ago.   Patient denies fever, chills, nausea, bilaterally. EOMs intact. No facial droop or slurred speech. No oral pallor. Mucous membranes moist.      Neck: No cervical lymphadenopathy. No stridor. Supple. No meningsmus. Heart: S1-S2. Regular rate and rhythm.        Lungs: good inspiratory ef evidence for otitis media. Patient does not have any respiratory distress. O2 saturation within normal limits for this patient. Does not appear clinically dehydrated and is tolerating oral intake.  Presentation consistent URI,  covid is negative xr shows n

## 2021-05-15 NOTE — TELEPHONE ENCOUNTER
Action Requested: Summary for Provider     []  Critical Lab, Recommendations Needed  [x] Need Additional Advice  [x]   FYI    []   Need Orders  [] Need Medications Sent to Pharmacy  []  Other     SUMMARY: Patient c/o productive cough with yellow sputum, wi

## 2021-05-15 NOTE — ED INITIAL ASSESSMENT (HPI)
Pt states that she has had cough and shortness of breath for 4 days. Pt had a negative rapid covid test 2 days ago and is awaiting the send out PCR.

## 2021-05-25 VITALS — HEART RATE: 69 BPM | OXYGEN SATURATION: 97 % | TEMPERATURE: 98.2 F | RESPIRATION RATE: 12 BRPM

## 2021-06-14 RX ORDER — MONTELUKAST SODIUM 10 MG/1
TABLET ORAL
Qty: 90 TABLET | Refills: 0 | Status: SHIPPED | OUTPATIENT
Start: 2021-06-14 | End: 2021-09-27

## 2021-09-27 RX ORDER — MONTELUKAST SODIUM 10 MG/1
TABLET ORAL
Qty: 90 TABLET | Refills: 0 | Status: SHIPPED | OUTPATIENT
Start: 2021-09-27

## 2021-09-27 NOTE — TELEPHONE ENCOUNTER
Received refill request for Singulair 10 mg - 1 tablet by mouth daily. LOV 4/5/21 for cough to return ? Refill pended and forwarded to Dr. Tyler Aquino for further directions.

## 2021-11-15 RX ORDER — LEVOCETIRIZINE DIHYDROCHLORIDE 5 MG/1
5 TABLET, FILM COATED ORAL NIGHTLY
Qty: 90 TABLET | Refills: 0 | Status: SHIPPED | OUTPATIENT
Start: 2021-11-15

## 2021-11-15 NOTE — TELEPHONE ENCOUNTER
Patient last seen in Allergy 4/5/2021 for . . .    Wheezing  (primary encounter diagnosis)  Chronic cough  Allergic rhinitis due to animal hair and dander  Environmental and seasonal allergies    Refill request received for .  . .    Levocetirizine Dihydroc

## 2021-11-15 NOTE — TELEPHONE ENCOUNTER
Message sent via [facility] to notify her of RX refill and need for follow up. Provided call back number to schedule.

## 2021-11-29 ENCOUNTER — TELEPHONE (OUTPATIENT)
Dept: OBGYN CLINIC | Facility: CLINIC | Age: 46
End: 2021-11-29

## 2021-11-29 NOTE — TELEPHONE ENCOUNTER
Pt is on NoraBe, reports irregular spotting or bleeding in between periods in the past 2 months. Denies cramping. Denies missing any pills and takes it daily within a few hours of the 24 hour andrew.   Advised pt her OCP is Progestin only and it is common t

## 2022-01-06 ENCOUNTER — OFFICE VISIT (OUTPATIENT)
Dept: INTERNAL MEDICINE CLINIC | Facility: CLINIC | Age: 47
End: 2022-01-06
Payer: COMMERCIAL

## 2022-01-06 VITALS
BODY MASS INDEX: 51.35 KG/M2 | SYSTOLIC BLOOD PRESSURE: 145 MMHG | HEART RATE: 81 BPM | WEIGHT: 272 LBS | RESPIRATION RATE: 18 BRPM | DIASTOLIC BLOOD PRESSURE: 83 MMHG | HEIGHT: 61 IN

## 2022-01-06 DIAGNOSIS — Z00.00 PHYSICAL EXAM, ANNUAL: Primary | ICD-10-CM

## 2022-01-06 DIAGNOSIS — Z12.31 SCREENING MAMMOGRAM, ENCOUNTER FOR: ICD-10-CM

## 2022-01-06 DIAGNOSIS — E55.9 VITAMIN D DEFICIENCY: ICD-10-CM

## 2022-01-06 DIAGNOSIS — Z12.11 COLON CANCER SCREENING: ICD-10-CM

## 2022-01-06 PROBLEM — E66.01 MORBID (SEVERE) OBESITY DUE TO EXCESS CALORIES (HCC): Status: ACTIVE | Noted: 2022-01-06

## 2022-01-06 PROCEDURE — 3008F BODY MASS INDEX DOCD: CPT | Performed by: INTERNAL MEDICINE

## 2022-01-06 PROCEDURE — 3079F DIAST BP 80-89 MM HG: CPT | Performed by: INTERNAL MEDICINE

## 2022-01-06 PROCEDURE — 99396 PREV VISIT EST AGE 40-64: CPT | Performed by: INTERNAL MEDICINE

## 2022-01-06 PROCEDURE — 3077F SYST BP >= 140 MM HG: CPT | Performed by: INTERNAL MEDICINE

## 2022-01-06 RX ORDER — AMLODIPINE BESYLATE 2.5 MG/1
2.5 TABLET ORAL DAILY
Qty: 90 TABLET | Refills: 1 | Status: SHIPPED | OUTPATIENT
Start: 2022-01-06

## 2022-01-06 NOTE — PROGRESS NOTES
Sarah Coates is a 55year old female.   Patient presents with:  Physical      HPI:   New  Pt   C/o establish care   C/o annual physical  Needs refills , ran out of her amlodipine today so did not take her medications       PMH  htn  All rhinitis -sees pain nasal congestion or sore throat  SKIN: denies any unusual skin lesions or rashes  RESPIRATORY: denies shortness of breath, cough, wheezing-- thinks its from her dog   CARDIOVASCULAR: denies chest pain on exertion, palpitations, swelling in feet  GI: d GASTRO - INTERNAL  Will refer          Pap-  pap   cscope - will refer   covid vaccine utd but due for booster   Pap -  page - 2018 - due to go back   Flu shot - declined   Labs 9/2020 reviewed ,       The patient indicates understanding of these issue

## 2022-02-19 NOTE — TELEPHONE ENCOUNTER
See last refill TE 11/14/21, pt due for appt in March 2022, [employer] message not read. Left message to call back to scheduled f/u appt.

## 2022-02-23 RX ORDER — LEVOCETIRIZINE DIHYDROCHLORIDE 5 MG/1
TABLET, FILM COATED ORAL
Qty: 90 TABLET | Refills: 0 | OUTPATIENT
Start: 2022-02-23

## 2022-03-10 RX ORDER — LEVOCETIRIZINE DIHYDROCHLORIDE 5 MG/1
5 TABLET, FILM COATED ORAL NIGHTLY
Qty: 30 TABLET | Refills: 0 | Status: SHIPPED | OUTPATIENT
Start: 2022-03-10 | End: 2022-03-10

## 2022-03-10 RX ORDER — LEVOCETIRIZINE DIHYDROCHLORIDE 5 MG/1
TABLET, FILM COATED ORAL
Qty: 90 TABLET | Refills: 0 | Status: SHIPPED | OUTPATIENT
Start: 2022-03-10

## 2022-04-04 ENCOUNTER — TELEPHONE (OUTPATIENT)
Dept: OBGYN CLINIC | Facility: CLINIC | Age: 47
End: 2022-04-04

## 2022-04-04 NOTE — TELEPHONE ENCOUNTER
Patient is requesting a refill request. Patient has annual scheduled for 07/27/2022. Patient is requesting a call to let her know the order has been sent out.

## 2022-04-04 NOTE — TELEPHONE ENCOUNTER
Message to [facility] to ask if you authorize OCP refill to cover pt to annual on 7/27/22? Last annual was 12/2020.  1/2018 normal Pap and neg HPV.   5/2019 normal mammo and pt has not scheduled next mammo. mammo ordered by PCP in 01/2022.

## 2022-04-05 RX ORDER — ACETAMINOPHEN AND CODEINE PHOSPHATE 120; 12 MG/5ML; MG/5ML
0.35 SOLUTION ORAL DAILY
Qty: 28 TABLET | Refills: 3 | Status: SHIPPED | OUTPATIENT
Start: 2022-04-05 | End: 2023-04-05

## 2022-04-05 NOTE — TELEPHONE ENCOUNTER
Please clarify below for what med pt is asking for a refill?   If it is her contraceptive then ok to refill until her annual exam in July

## 2022-04-05 NOTE — TELEPHONE ENCOUNTER
Pt states she needs refills on her Rincon Pellet be. Pt informed rx will be sent to cover until her annual exam in July. Pharmacy verified. Rx sent.

## 2022-04-06 ENCOUNTER — TELEPHONE (OUTPATIENT)
Dept: ALLERGY | Facility: CLINIC | Age: 47
End: 2022-04-06

## 2022-04-06 ENCOUNTER — OFFICE VISIT (OUTPATIENT)
Dept: ALLERGY | Facility: CLINIC | Age: 47
End: 2022-04-06
Payer: COMMERCIAL

## 2022-04-06 VITALS
TEMPERATURE: 99 F | WEIGHT: 276 LBS | SYSTOLIC BLOOD PRESSURE: 140 MMHG | HEART RATE: 78 BPM | DIASTOLIC BLOOD PRESSURE: 85 MMHG | HEIGHT: 61 IN | BODY MASS INDEX: 52.11 KG/M2

## 2022-04-06 DIAGNOSIS — J45.30 MILD PERSISTENT EXTRINSIC ASTHMA WITHOUT COMPLICATION: ICD-10-CM

## 2022-04-06 DIAGNOSIS — J30.81 ALLERGIC RHINITIS DUE TO ANIMAL HAIR AND DANDER: Primary | ICD-10-CM

## 2022-04-06 DIAGNOSIS — G47.33 OSA (OBSTRUCTIVE SLEEP APNEA): ICD-10-CM

## 2022-04-06 DIAGNOSIS — K21.9 GASTROESOPHAGEAL REFLUX DISEASE, UNSPECIFIED WHETHER ESOPHAGITIS PRESENT: ICD-10-CM

## 2022-04-06 PROCEDURE — 3008F BODY MASS INDEX DOCD: CPT | Performed by: ALLERGY & IMMUNOLOGY

## 2022-04-06 PROCEDURE — 3077F SYST BP >= 140 MM HG: CPT | Performed by: ALLERGY & IMMUNOLOGY

## 2022-04-06 PROCEDURE — 99214 OFFICE O/P EST MOD 30 MIN: CPT | Performed by: ALLERGY & IMMUNOLOGY

## 2022-04-06 PROCEDURE — 3079F DIAST BP 80-89 MM HG: CPT | Performed by: ALLERGY & IMMUNOLOGY

## 2022-04-06 RX ORDER — ALBUTEROL SULFATE 90 UG/1
2 AEROSOL, METERED RESPIRATORY (INHALATION) EVERY 6 HOURS PRN
Qty: 1 EACH | Refills: 0 | Status: SHIPPED | OUTPATIENT
Start: 2022-04-06

## 2022-04-06 RX ORDER — FLUTICASONE PROPIONATE 50 MCG
2 SPRAY, SUSPENSION (ML) NASAL DAILY
Qty: 3 EACH | Refills: 1 | Status: SHIPPED | OUTPATIENT
Start: 2022-04-06

## 2022-04-06 RX ORDER — MONTELUKAST SODIUM 10 MG/1
10 TABLET ORAL NIGHTLY
Qty: 90 TABLET | Refills: 2 | Status: SHIPPED | OUTPATIENT
Start: 2022-04-06

## 2022-04-06 RX ORDER — PREDNISONE 10 MG/1
TABLET ORAL
Qty: 15 TABLET | Refills: 0 | Status: SHIPPED | OUTPATIENT
Start: 2022-04-06

## 2022-04-06 RX ORDER — LEVOCETIRIZINE DIHYDROCHLORIDE 5 MG/1
5 TABLET, FILM COATED ORAL NIGHTLY
Qty: 90 TABLET | Refills: 3 | Status: SHIPPED | OUTPATIENT
Start: 2022-04-06

## 2022-04-06 NOTE — PATIENT INSTRUCTIONS
#1 asthma  More recent symptoms over the past 1 to 2 months. Current albuterol has . Symptoms in spite of current Singulair. No prior inhaled corticosteroids    Recs: Start prednisone 30 mg once a day with food x5 days  Continue with Singulair, montelukast 10 mg once a day  Consider trial of ICS/LABA should symptoms return after prednisone or not improve  Consider chest x-ray if not improving  Albuterol 2 puffs every 4-6 hours as needed if having active coughing wheezing shortness of breath  Reviewed GERD is a potential trigger given her recent symptoms    #2 allergic rhinitis  Xyzal 5 g once night at bedtime  Singulair/montelukast 10 mg a day  Start Flonase 2 sprays per nostril once a day. Reviewed avoidance measures and potential treatment option immunotherapy    #3 GERD  Recent current symptoms. Using Tums as needed. Consider Prilosec or Prevacid if symptoms are more persistent in nature. Reviewed anti-GERD measures including not eating before bedtime, avoiding hot foods spicy foods caffeinated foods acidic foods and alcohol    #4 obstructive sleep apnea has CPAP machine not currently using. Recommend follow-up with  Sleep specialist to see if adjustments can be made to her CPAP to make it more tolerable. Recommended Dr. Ash Perez which her  also sees for sleep apnea    #5 COVID vaccines up-to-date x2 doses.   Recommend booster as she is eligible    #6 recommend flu vaccine in the fall

## 2022-06-27 DIAGNOSIS — Z00.00 PHYSICAL EXAM, ANNUAL: ICD-10-CM

## 2022-06-29 RX ORDER — AMLODIPINE BESYLATE 2.5 MG/1
TABLET ORAL
Qty: 90 TABLET | Refills: 1 | Status: SHIPPED | OUTPATIENT
Start: 2022-06-29

## 2022-07-01 ENCOUNTER — HOSPITAL ENCOUNTER (OUTPATIENT)
Age: 47
Discharge: HOME | End: 2022-07-01
Payer: COMMERCIAL

## 2022-07-01 VITALS
DIASTOLIC BLOOD PRESSURE: 91 MMHG | SYSTOLIC BLOOD PRESSURE: 171 MMHG | RESPIRATION RATE: 16 BRPM | TEMPERATURE: 98 F | HEART RATE: 96 BPM | OXYGEN SATURATION: 100 %

## 2022-07-01 DIAGNOSIS — B97.89 VIRAL RESPIRATORY ILLNESS: Primary | ICD-10-CM

## 2022-07-01 DIAGNOSIS — J98.8 VIRAL RESPIRATORY ILLNESS: Primary | ICD-10-CM

## 2022-07-01 LAB
S PYO AG THROAT QL: NEGATIVE
SARS-COV-2 RNA RESP QL NAA+PROBE: NOT DETECTED

## 2022-07-03 ENCOUNTER — TELEPHONE (OUTPATIENT)
Dept: INTERNAL MEDICINE CLINIC | Facility: CLINIC | Age: 47
End: 2022-07-03

## 2022-07-03 RX ORDER — AMOXICILLIN AND CLAVULANATE POTASSIUM 875; 125 MG/1; MG/1
1 TABLET, FILM COATED ORAL 2 TIMES DAILY
Qty: 20 TABLET | Refills: 0 | Status: SHIPPED | OUTPATIENT
Start: 2022-07-03 | End: 2022-07-13

## 2022-07-03 NOTE — TELEPHONE ENCOUNTER
Patient has swollen tonsils , pus , fever , nodes, strept and covid negative on /Friday , patient feeling worse, will give Augmentin .  Sent to pharmacy

## 2022-07-27 ENCOUNTER — OFFICE VISIT (OUTPATIENT)
Dept: OBGYN CLINIC | Facility: CLINIC | Age: 47
End: 2022-07-27
Payer: COMMERCIAL

## 2022-07-27 VITALS
BODY MASS INDEX: 52 KG/M2 | HEART RATE: 73 BPM | DIASTOLIC BLOOD PRESSURE: 86 MMHG | WEIGHT: 275.38 LBS | SYSTOLIC BLOOD PRESSURE: 143 MMHG

## 2022-07-27 DIAGNOSIS — Z01.419 ENCOUNTER FOR GYNECOLOGICAL EXAMINATION WITHOUT ABNORMAL FINDING: Primary | ICD-10-CM

## 2022-07-27 DIAGNOSIS — R32 URINARY INCONTINENCE, UNSPECIFIED TYPE: ICD-10-CM

## 2022-07-27 DIAGNOSIS — Z12.31 VISIT FOR SCREENING MAMMOGRAM: ICD-10-CM

## 2022-07-27 PROCEDURE — 99396 PREV VISIT EST AGE 40-64: CPT | Performed by: OBSTETRICS & GYNECOLOGY

## 2022-07-27 PROCEDURE — 3079F DIAST BP 80-89 MM HG: CPT | Performed by: OBSTETRICS & GYNECOLOGY

## 2022-07-27 PROCEDURE — 3077F SYST BP >= 140 MM HG: CPT | Performed by: OBSTETRICS & GYNECOLOGY

## 2022-07-27 PROCEDURE — 99212 OFFICE O/P EST SF 10 MIN: CPT | Performed by: OBSTETRICS & GYNECOLOGY

## 2022-07-27 RX ORDER — ACETAMINOPHEN AND CODEINE PHOSPHATE 120; 12 MG/5ML; MG/5ML
0.35 SOLUTION ORAL DAILY
Qty: 84 TABLET | Refills: 3 | Status: SHIPPED | OUTPATIENT
Start: 2022-07-27 | End: 2023-07-27

## 2022-07-28 LAB — HPV I/H RISK 1 DNA SPEC QL NAA+PROBE: NEGATIVE

## 2022-12-28 RX ORDER — MONTELUKAST SODIUM 10 MG/1
TABLET ORAL
Qty: 90 TABLET | Refills: 0 | Status: SHIPPED | OUTPATIENT
Start: 2022-12-28

## 2023-01-01 NOTE — TELEPHONE ENCOUNTER
[place] Discharge Instructions: Frisian  Vin vez no esté mays de cuándo mendosa bebé está enfermo y debe beata al médico, especialmente si es mendosa primer bebé. Si está preocupada sobre la tay de mendosa bebé, no espere para llamar a mendosa clínica. La mayoría de las clínicas cuentan con jessie línea de ayuda de enfermería las 24 horas. Pueden responder reyna preguntas o ponerse en contacto con mendosa médico las 24 horas. Lo mejor es llamar a mendosa médico o clínica en lugar de llamar al hospital. Nadie pensará que es tonta por pedir ayuda.    Llame al 911 si mendosa bebé:  Está flácido y blando  Tiene los brazos o piernas rígidos o hace movimientos rápidos y bruscos repetidamente  Arquea la espalda repetidamente  Tiene un llanto jesika  Tiene la piel de un tima azulado o se ve muy pálido    Llame al médico de mendosa bebé o acuda a la anca de emergencias de inmediato si mendosa bebé:  Tiene fiebre lyndon: Temperatura rectal de 100.4  F (38  C) o más o jessie temperatura axilar de 99  F (37.2  C) o más.  Tiene la piel amarillenta y el bebé se ve muy somnoliento.  Tiene jessie infección (enrojecimiento, hinchazón, dolor, mal olor o supuración) alrededor del cordón umbilical o pene circuncidado O sangrado que no se detiene después de algunos minutos.    Llame a la clínica de mendosa bebé si nota:  Jessie temperatura rectal baja (97.5   o 36.4  C).  Cambios en mendosa comportamiento. Si por ejemplo, un bebé que generalmente es tranquilo pasa todo el día muy inquieto e irritable, o si un bebé activo está muy adormecido y flácido.  Vómitos. [place] no es regurgitar después de alimentarse, que es normal, sino vomitar realmente el contenido del estómago.  Diarrea (materia fecal acuosa) o estreñimiento (materia dura y seca, difícil de pasar). La materia fecal de los recién nacidos suele ser bastante blanda, sharri no debería ser acuosa.  Demarcus o mucosidad en la materia fecal.  Cambios en la respiración o tos (respiración acelerada, forzosa o ruchi después de quitarle la mucosidad de la  Refill requested for    Levocetirizine Dihydrochloride (XYZAL) 5 MG Oral Tab         Sig: Take 1 tablet (5 mg total) by mouth nightly.     Disp:  30 tablet    Refills:  0    Start: 3/15/2021    Class: Normal    Non-formulary    To pharmacy: Patient needs an sky).  Problemas para alimentarse, con mucha regurgitación.  Parry bebé no quiere alimentarse por más de 6 a 8 horas o ha ensuciado menos pañales que lo que se espera en un período de 24 horas. Consulte el registro de alimentación para beata la cantidad de pañales mojados los primeros días de libra.    Si le preocupa hacerse daño o hacerle daño al bebé, llame al médico de inmediato.    [place] Discharge Instructions  You may not be sure when your baby is sick and needs to see a doctor, especially if this is your first baby.  DO call your clinic if you are worried about your baby s health.  Most clinics have a 24-hour nurse help line. They are able to answer your questions or reach your doctor 24 hours a day. It is best to call your doctor or clinic instead of the hospital. We are here to help you.    Call 911 if your baby:  Is limp and floppy  Has stiff arms or legs or repeated jerking movements  Arches his or her back repeatedly  Has a high-pitched cry  Has bluish skin or looks very pale    Call your baby s doctor or go to the emergency room right away if your baby:  Has a high fever: Rectal temperature of 100.4  F (38  C) or higher or underarm temperature of 99  F (37.2  C) or higher.  Has skin that looks yellow, and the baby seems very sleepy.  Has an infection (redness, swelling, pain, smells bad or has drainage) around the umbilical cord or circumcised penis OR bleeding that does not stop after a few minutes.    Call your baby s clinic if you notice:  A low rectal temperature of (97.5  F or 36.4 C).  Changes in behavior. For example, a normally quiet baby is very fussy and irritable all day, or an active baby is very sleepy and limp.  Vomiting. This is not spitting up after feedings, which is normal, but actually throwing up the contents of the stomach.  Diarrhea (watery stools) or constipation (hard, dry stools that are difficult to pass). [place] stools are usually quite soft but should not be watery.  Blood or  mucus in the stools.  Coughing or breathing changes (fast breathing, forceful breathing, or noisy breathing after you clear mucus from the nose).  Feeding problems with a lot of spitting up.  Your baby does not want to feed for more than 6 to 8 hours or has fewer diapers than expected in a 24-hour period. Refer to the feeding log for expected number of wet diapers in the first days of life.    If you have any concerns about hurting yourself of the baby, call your doctor right away.     Baby's Birth Weight: 6 lb 13.4 oz (3100 g)  Baby's Discharge Weight: 2.736 kg (6 lb 0.5 oz)    Recent Labs   Lab Test 23  1053   DBIL 0.34*   BILITOTAL 5.2       Immunization History   Administered Date(s) Administered    Hepatitis B (Peds <19Y) 2023       Hearing Screen Date: 23   Hearing Screen, Left Ear: referred (Will rescreen tomorrow before discharge.)  Hearing Screen, Right Ear: passed     Umbilical Cord: drying    Pulse Oximetry Screen Result: pass  (right arm): 98 %  (foot): 100 %    Car Seat Testing Results:      Date and Time of [place] Metabolic Screen: 23 1050     ID Band Number ________  I have checked to make sure that this is my baby.

## 2023-01-12 ENCOUNTER — NURSE TRIAGE (OUTPATIENT)
Dept: INTERNAL MEDICINE CLINIC | Facility: CLINIC | Age: 48
End: 2023-01-12

## 2023-01-13 ENCOUNTER — OFFICE VISIT (OUTPATIENT)
Dept: INTERNAL MEDICINE CLINIC | Facility: CLINIC | Age: 48
End: 2023-01-13

## 2023-01-13 ENCOUNTER — LAB ENCOUNTER (OUTPATIENT)
Dept: LAB | Age: 48
End: 2023-01-13
Attending: NURSE PRACTITIONER
Payer: COMMERCIAL

## 2023-01-13 VITALS
WEIGHT: 276 LBS | OXYGEN SATURATION: 97 % | RESPIRATION RATE: 14 BRPM | SYSTOLIC BLOOD PRESSURE: 118 MMHG | HEART RATE: 74 BPM | BODY MASS INDEX: 52.11 KG/M2 | HEIGHT: 61 IN | DIASTOLIC BLOOD PRESSURE: 72 MMHG

## 2023-01-13 DIAGNOSIS — M54.9 MID BACK PAIN ON LEFT SIDE: ICD-10-CM

## 2023-01-13 DIAGNOSIS — M54.9 MID BACK PAIN ON LEFT SIDE: Primary | ICD-10-CM

## 2023-01-13 LAB
ALBUMIN SERPL-MCNC: 3.5 G/DL (ref 3.4–5)
ALBUMIN/GLOB SERPL: 0.9 {RATIO} (ref 1–2)
ALP LIVER SERPL-CCNC: 80 U/L
ALT SERPL-CCNC: 33 U/L
ANION GAP SERPL CALC-SCNC: 5 MMOL/L (ref 0–18)
AST SERPL-CCNC: 19 U/L (ref 15–37)
BASOPHILS # BLD AUTO: 0.06 X10(3) UL (ref 0–0.2)
BASOPHILS NFR BLD AUTO: 0.8 %
BILIRUB SERPL-MCNC: 0.6 MG/DL (ref 0.1–2)
BILIRUB UR QL: NEGATIVE
BUN BLD-MCNC: 12 MG/DL (ref 7–18)
BUN/CREAT SERPL: 19.4 (ref 10–20)
CALCIUM BLD-MCNC: 9 MG/DL (ref 8.5–10.1)
CHLORIDE SERPL-SCNC: 106 MMOL/L (ref 98–112)
CLARITY UR: CLEAR
CO2 SERPL-SCNC: 28 MMOL/L (ref 21–32)
COLOR UR: YELLOW
CREAT BLD-MCNC: 0.62 MG/DL
D DIMER PPP FEU-MCNC: 0.43 UG/ML FEU (ref ?–0.5)
DEPRECATED RDW RBC AUTO: 43.8 FL (ref 35.1–46.3)
EOSINOPHIL # BLD AUTO: 0.22 X10(3) UL (ref 0–0.7)
EOSINOPHIL NFR BLD AUTO: 2.8 %
ERYTHROCYTE [DISTWIDTH] IN BLOOD BY AUTOMATED COUNT: 13.3 % (ref 11–15)
FASTING STATUS PATIENT QL REPORTED: NO
GFR SERPLBLD BASED ON 1.73 SQ M-ARVRAT: 110 ML/MIN/1.73M2 (ref 60–?)
GLOBULIN PLAS-MCNC: 4.1 G/DL (ref 2.8–4.4)
GLUCOSE BLD-MCNC: 91 MG/DL (ref 70–99)
GLUCOSE UR-MCNC: NEGATIVE MG/DL
HCT VFR BLD AUTO: 38.7 %
HGB BLD-MCNC: 12.4 G/DL
HGB UR QL STRIP.AUTO: NEGATIVE
IMM GRANULOCYTES # BLD AUTO: 0.02 X10(3) UL (ref 0–1)
IMM GRANULOCYTES NFR BLD: 0.3 %
KETONES UR-MCNC: NEGATIVE MG/DL
LEUKOCYTE ESTERASE UR QL STRIP.AUTO: NEGATIVE
LYMPHOCYTES # BLD AUTO: 2.22 X10(3) UL (ref 1–4)
LYMPHOCYTES NFR BLD AUTO: 28.5 %
MCH RBC QN AUTO: 28.8 PG (ref 26–34)
MCHC RBC AUTO-ENTMCNC: 32 G/DL (ref 31–37)
MCV RBC AUTO: 89.8 FL
MONOCYTES # BLD AUTO: 0.55 X10(3) UL (ref 0.1–1)
MONOCYTES NFR BLD AUTO: 7.1 %
NEUTROPHILS # BLD AUTO: 4.72 X10 (3) UL (ref 1.5–7.7)
NEUTROPHILS # BLD AUTO: 4.72 X10(3) UL (ref 1.5–7.7)
NEUTROPHILS NFR BLD AUTO: 60.5 %
NITRITE UR QL STRIP.AUTO: NEGATIVE
OSMOLALITY SERPL CALC.SUM OF ELEC: 287 MOSM/KG (ref 275–295)
PH UR: 5.5 [PH] (ref 5–8)
PLATELET # BLD AUTO: 341 10(3)UL (ref 150–450)
POTASSIUM SERPL-SCNC: 3.8 MMOL/L (ref 3.5–5.1)
PROT SERPL-MCNC: 7.6 G/DL (ref 6.4–8.2)
PROT UR-MCNC: NEGATIVE MG/DL
RBC # BLD AUTO: 4.31 X10(6)UL
SODIUM SERPL-SCNC: 139 MMOL/L (ref 136–145)
SP GR UR STRIP: 1.02 (ref 1–1.03)
UROBILINOGEN UR STRIP-ACNC: 0.2
WBC # BLD AUTO: 7.8 X10(3) UL (ref 4–11)

## 2023-01-13 PROCEDURE — 85379 FIBRIN DEGRADATION QUANT: CPT

## 2023-01-13 PROCEDURE — 80053 COMPREHEN METABOLIC PANEL: CPT

## 2023-01-13 PROCEDURE — 81003 URINALYSIS AUTO W/O SCOPE: CPT | Performed by: NURSE PRACTITIONER

## 2023-01-13 PROCEDURE — 36415 COLL VENOUS BLD VENIPUNCTURE: CPT

## 2023-01-13 PROCEDURE — 85025 COMPLETE CBC W/AUTO DIFF WBC: CPT

## 2023-01-13 RX ORDER — CYCLOBENZAPRINE HCL 5 MG
5 TABLET ORAL NIGHTLY
Qty: 30 TABLET | Refills: 0 | Status: SHIPPED | OUTPATIENT
Start: 2023-01-13

## 2023-01-15 DIAGNOSIS — Z00.00 PHYSICAL EXAM, ANNUAL: ICD-10-CM

## 2023-01-16 RX ORDER — AMLODIPINE BESYLATE 2.5 MG/1
2.5 TABLET ORAL DAILY
Qty: 90 TABLET | Refills: 1 | Status: SHIPPED | OUTPATIENT
Start: 2023-01-16

## 2023-01-16 NOTE — TELEPHONE ENCOUNTER
Refill passed per [facility] protocol.       Requested Prescriptions   Pending Prescriptions Disp Refills    AMLODIPINE 2.5 MG Oral Tab [Pharmacy Med Name: AMLODIPINE BESYLATE 2.5MG TABLETS] 90 tablet 1     Sig: TAKE 1 TABLET BY MOUTH DAILY       Hypertensive Medications Protocol Passed - 1/15/2023  1:05 PM        Passed - In person appointment in the past 12 or next 3 months     Recent Outpatient Visits              3 days ago Mid back pain on left side    [facility], [address], [facility], [place], APRN    Office Visit    5 months ago Encounter for gynecological examination without abnormal finding    [address], [address] , [facility] [facility] Janet Cash MD    Office Visit    9 months ago Allergic rhinitis due to animal hair and dander    Bao Dolan, Wilder Rahman MD    Office Visit    1 year ago Physical exam, annual    Judy Dolan, Betito Ryan MD    Office Visit    1 year ago [facility] [place] Janey Koyanagi, MD    Office Visit          Future Appointments         Provider Department Appt Notes    In 1 month Janey Koyanagi, MD [address], [facility] My breathing/lungs               Passed - Last BP reading less than 140/90     BP Readings from Last 1 Encounters:  01/13/23 : 118/72              Passed - CMP or BMP in past 6 months     Recent Results (from the past 4392 hour(s))   COMP METABOLIC PANEL (14)    Collection Time: 01/13/23 10:55 AM   Result Value Ref Range    Glucose 91 70 - 99 mg/dL    Sodium 139 136 - 145 mmol/L    Potassium 3.8 3.5 - 5.1 mmol/L    Chloride 106 98 - 112 mmol/L    CO2 28.0 21.0 - 32.0 mmol/L    Anion Gap 5 0 - 18 mmol/L    BUN 12 7 - 18 mg/dL    Creatinine 0.62 0.55 - 1.02 mg/dL    BUN/CREA Ratio 19.4 10.0 - 20.0    Calcium, Total 9.0 8.5 - 10.1 mg/dL    Calculated Osmolality 287 275 - 295 mOsm/kg    eGFR-Cr 110 >=60 mL/min/1.73m2    ALT 33 13 - 56 U/L    AST 19 15 - 37 U/L    Alkaline Phosphatase 80 39 - 100 U/L    Bilirubin, Total 0.6 0.1 - 2.0 mg/dL    Total Protein 7.6 6.4 - 8.2 g/dL    Albumin 3.5 3.4 - 5.0 g/dL    Globulin  4.1 2.8 - 4.4 g/dL    A/G Ratio 0.9 (L) 1.0 - 2.0    Patient Fasting for CMP? No      *Note: Due to a large number of results and/or encounters for the requested time period, some results have not been displayed. A complete set of results can be found in Results Review.                Passed - In person appointment or virtual visit in the past 6 months     Recent Outpatient Visits              3 days ago Mid back pain on left side    [facility], [address] [place] [address]    Office Visit    5 months ago Encounter for gynecological examination without abnormal finding    [address], [address] [place] , [address] [place] [address] Duane Lemon MD    Office Visit    9 months ago Allergic rhinitis due to animal hair and dander    Luis E Cagemhhelena Guo MD    Office Visit    1 year ago Physical exam, annual    Michael Cage MD    Office Visit    1 year ago Luis Gordon MD    Office Visit          Future Appointments         Provider Department Appt Notes    In 1 month Jose Maria Guo MD [address], [place] My breathing/lungs               Passed - [facility] or GFRNAA > 50     GFR Evaluation  EGFRCR: 110 , resulted on 1/13/2023                Future Appointments         Provider Department Appt Notes    In 1 month Jose Maria Guo MD [address], [place] My breathing/lungs            Recent Outpatient Visits              3 days ago Mid back pain on left side    [facility], [address] [place], [place], [place], [facility]    Office Visit    5 months ago Encounter for gynecological examination without abnormal finding    [address], [address] Jigar Soni MD    Office Visit    9 months ago Allergic rhinitis due to animal hair and dander    Faviola Mayes MD    Office Visit    1 year ago Physical exam, annual    Faviola Mayes MD    Office Visit    1 year ago Luis E Webbmhhelena Santana MD    Office Visit

## 2023-02-16 ENCOUNTER — OFFICE VISIT (OUTPATIENT)
Dept: ALLERGY | Facility: CLINIC | Age: 48
End: 2023-02-16
Payer: COMMERCIAL

## 2023-02-16 VITALS
OXYGEN SATURATION: 97 % | HEIGHT: 61 IN | SYSTOLIC BLOOD PRESSURE: 130 MMHG | HEART RATE: 94 BPM | WEIGHT: 275 LBS | BODY MASS INDEX: 51.92 KG/M2 | DIASTOLIC BLOOD PRESSURE: 70 MMHG

## 2023-02-16 DIAGNOSIS — Z92.29 COVID-19 VACCINE SERIES COMPLETED: ICD-10-CM

## 2023-02-16 DIAGNOSIS — J30.81 ALLERGIC RHINITIS DUE TO ANIMAL HAIR AND DANDER: ICD-10-CM

## 2023-02-16 DIAGNOSIS — K21.9 GASTROESOPHAGEAL REFLUX DISEASE, UNSPECIFIED WHETHER ESOPHAGITIS PRESENT: ICD-10-CM

## 2023-02-16 DIAGNOSIS — J45.30 MILD PERSISTENT EXTRINSIC ASTHMA WITHOUT COMPLICATION: Primary | ICD-10-CM

## 2023-02-16 DIAGNOSIS — Z23 NEED FOR VACCINATION AGAINST STREPTOCOCCUS PNEUMONIAE USING PNEUMOCOCCAL CONJUGATE VACCINE 7: ICD-10-CM

## 2023-02-16 DIAGNOSIS — Z23 FLU VACCINE NEED: ICD-10-CM

## 2023-02-16 PROCEDURE — 3078F DIAST BP <80 MM HG: CPT | Performed by: ALLERGY & IMMUNOLOGY

## 2023-02-16 PROCEDURE — 3008F BODY MASS INDEX DOCD: CPT | Performed by: ALLERGY & IMMUNOLOGY

## 2023-02-16 PROCEDURE — 99214 OFFICE O/P EST MOD 30 MIN: CPT | Performed by: ALLERGY & IMMUNOLOGY

## 2023-02-16 PROCEDURE — 3075F SYST BP GE 130 - 139MM HG: CPT | Performed by: ALLERGY & IMMUNOLOGY

## 2023-02-16 RX ORDER — COVID-19 MOLECULAR TEST ASSAY
KIT MISCELLANEOUS
COMMUNITY
Start: 2022-11-22

## 2023-02-16 RX ORDER — MONTELUKAST SODIUM 10 MG/1
10 TABLET ORAL NIGHTLY
Qty: 90 TABLET | Refills: 2 | Status: SHIPPED | OUTPATIENT
Start: 2023-02-16

## 2023-02-16 RX ORDER — LEVOCETIRIZINE DIHYDROCHLORIDE 5 MG/1
5 TABLET, FILM COATED ORAL NIGHTLY
Qty: 90 TABLET | Refills: 2 | Status: SHIPPED | OUTPATIENT
Start: 2023-02-16

## 2023-02-16 NOTE — PATIENT INSTRUCTIONS
#1 asthma  Denies symptoms more than 2 days/week on a regular basis. Some intermittent wheezing and coughing. Has yet to try albuterol for any symptoms. Currently on singular. No ED visits or prednisone in the interim. Recommended trial of albuterol should cough or wheezing present. Patient can be posted if having more persistent symptoms more than 2 days/week  Continue with Singulair    #2 allergic rhinitis  Continue with Xyzal and Singulair. Restart Flonase if refractory. Reviewed avoidance measures and potential treatment option immunotherapy. Last testing was in 2019 with Dr. Janeice Boeck to ragweed mold dust mite cats and dogs. 1 dog in the home    #3 GERD  Reviewed anti-GERD measures. Avoid hot foods spicy foods acidic foods caffeine chocolate, no eating 2 hours prior to bed  Prevacid or Prilosec as needed    #4 COVID vaccination up-to-date x2 doses. Recommend booster    #5 flu vaccine recommended. Patient defers    #6 Prevnar 20 recommended given history of asthma. Patient to consider.

## 2023-03-29 RX ORDER — LEVOCETIRIZINE DIHYDROCHLORIDE 5 MG/1
TABLET, FILM COATED ORAL
Qty: 90 TABLET | Refills: 2 | Status: SHIPPED | OUTPATIENT
Start: 2023-03-29

## 2023-03-29 RX ORDER — MONTELUKAST SODIUM 10 MG/1
TABLET ORAL
Qty: 90 TABLET | Refills: 2 | Status: SHIPPED | OUTPATIENT
Start: 2023-03-29

## 2023-05-31 ENCOUNTER — TELEPHONE (OUTPATIENT)
Dept: OBGYN CLINIC | Facility: CLINIC | Age: 48
End: 2023-05-31

## 2023-05-31 NOTE — TELEPHONE ENCOUNTER
Pt stated she is on birth control. Has not gotten her period in about 7 weeks. Had negative pregnancy test. Please advise.

## 2023-05-31 NOTE — TELEPHONE ENCOUNTER
Currently on the mini-pill. States she takes the pill same time everyday, does not miss, skip or double up. Pt states regular cycles while on the pill up until month ago. Pt states has not had a cycle for 7 wks. Has taken multiple UPT's and all negative. Pt does have increase stress in life, but indicates it is \"managable\". Denies changes to weight, exercise or diet. States last cycle was \"normal\". Pt informed to continue to monitor. Since UPT's are negative absent cycle could be related to stress. Pt informed provider indicate if no cycle for 3 months would recommend coming in for appt. Pt states understanding. To [facility] to please review. Thank you.

## 2023-06-01 NOTE — TELEPHONE ENCOUNTER
Absent menses is common on ocps, especially progestin only pills. Please reassure the patient. Sometimes it takes several months for the pill to thin out the lining enough to get to amenorrhea which is why she initially had menses.

## 2023-06-12 ENCOUNTER — WALK IN (OUTPATIENT)
Dept: URGENT CARE | Age: 48
End: 2023-06-12

## 2023-06-12 VITALS
SYSTOLIC BLOOD PRESSURE: 124 MMHG | DIASTOLIC BLOOD PRESSURE: 80 MMHG | OXYGEN SATURATION: 94 % | WEIGHT: 275 LBS | RESPIRATION RATE: 17 BRPM | HEIGHT: 62 IN | BODY MASS INDEX: 50.61 KG/M2 | TEMPERATURE: 98.5 F | HEART RATE: 97 BPM

## 2023-06-12 DIAGNOSIS — J01.90 ACUTE RHINOSINUSITIS: Primary | ICD-10-CM

## 2023-06-12 PROCEDURE — 99203 OFFICE O/P NEW LOW 30 MIN: CPT | Performed by: NURSE PRACTITIONER

## 2023-06-12 RX ORDER — AMOXICILLIN AND CLAVULANATE POTASSIUM 875; 125 MG/1; MG/1
1 TABLET, FILM COATED ORAL 2 TIMES DAILY
Qty: 20 TABLET | Refills: 0 | Status: SHIPPED | OUTPATIENT
Start: 2023-06-12 | End: 2023-06-22

## 2023-06-12 RX ORDER — LEVOCETIRIZINE DIHYDROCHLORIDE 5 MG/1
TABLET, FILM COATED ORAL
COMMUNITY
Start: 2023-03-29

## 2023-06-12 RX ORDER — ALBUTEROL SULFATE 90 UG/1
2 AEROSOL, METERED RESPIRATORY (INHALATION) EVERY 4 HOURS PRN
COMMUNITY

## 2023-06-12 RX ORDER — MONTELUKAST SODIUM 10 MG/1
TABLET ORAL
COMMUNITY
Start: 2023-03-29

## 2023-06-12 RX ORDER — ACETAMINOPHEN AND CODEINE PHOSPHATE 120; 12 MG/5ML; MG/5ML
SOLUTION ORAL
COMMUNITY
Start: 2023-05-28

## 2023-06-12 ASSESSMENT — ENCOUNTER SYMPTOMS
SINUS PAIN: 1
FATIGUE: 1
FEVER: 1
NAUSEA: 0
DIARRHEA: 1
EYE REDNESS: 0
EYE ITCHING: 0
HEADACHES: 0
WHEEZING: 1
RHINORRHEA: 0
COUGH: 1
EYE DISCHARGE: 0
VOMITING: 0
SORE THROAT: 1
CHILLS: 1

## 2023-06-12 ASSESSMENT — PAIN SCALES - GENERAL: PAINLEVEL: 5

## 2023-07-26 DIAGNOSIS — Z00.00 PHYSICAL EXAM, ANNUAL: ICD-10-CM

## 2023-07-27 RX ORDER — AMLODIPINE BESYLATE 2.5 MG/1
2.5 TABLET ORAL DAILY
Qty: 90 TABLET | Refills: 1 | Status: SHIPPED | OUTPATIENT
Start: 2023-07-27

## 2023-07-27 NOTE — TELEPHONE ENCOUNTER
Please review. Protocol Failed or has No Protocol. Requested Prescriptions   Pending Prescriptions Disp Refills    AMLODIPINE 2.5 MG Oral Tab [Pharmacy Med Name: AMLODIPINE BESYLATE 2.5MG TABLETS] 90 tablet 1     Sig: TAKE 1 TABLET(2.5 MG) BY MOUTH DAILY       Hypertensive Medications Protocol Failed - 7/26/2023 11:45 AM        Failed - CMP or BMP in past 6 months     No results found for this or any previous visit (from the past 4392 hour(s)).             Failed - In person appointment or virtual visit in the past 6 months     Recent Outpatient Visits              5 months ago Mild persistent extrinsic asthma without complication    [facility], [address] [place] [address], Soledad Zacarias MD    Office Visit    6 months ago Mid back pain on left side    [facility], [address] [place] [address] [facility], [facility]    Office Visit    1 year ago Encounter for gynecological examination without abnormal finding    [facility], [address] Anam Santiago MD    Office Visit    1 year ago Allergic rhinitis due to animal hair and dander    [facility], [address] [place] [address], Soledad Zacarias MD    Office Visit    1 year ago Physical exam, annual    Andie Frederick MD    Office Visit                      Passed - In person appointment in the past 12 or next 3 months     Recent Outpatient Visits              5 months ago Mild persistent extrinsic asthma without complication    [facility], [address] [place] [address] Yanira Smith MD    Office Visit    6 months ago Mid back pain on left side    [facility], [address] [place], [place] [address]    Office Visit    1 year ago Encounter for gynecological examination without abnormal finding    [facility], [address], [address] [facility], Jaimie Larson MD    Office Visit    1 year ago Allergic rhinitis due to animal hair and dander    [facility], [address] [facility], Simeon Hua MD    Office Visit    1 year ago Physical exam, annual    [address], [address] [facility] Loyd Barboza MD    Office Visit                      Passed - Last BP reading less than 140/90     BP Readings from Last 1 Encounters:  02/16/23 : 130/70              Passed - EGFRCR or GFRNAA > 50     GFR Evaluation  EGFRCR: 110 , resulted on 1/13/2023                   Recent Outpatient Visits              5 months ago Mild persistent extrinsic asthma without complication    [facility], [address] [facility], Simeon Hua MD    Office Visit    6 months ago Mid back pain on left side    [facility], [facility] [place], [facility]    Office Visit    1 year ago Encounter for gynecological examination without abnormal finding    [address], [address] Nikita Page MD    Office Visit    1 year ago Allergic rhinitis due to animal hair and dander    Faviola Osman MD    Office Visit    1 year ago Physical exam, annual    Loyd Osman MD    Office Visit

## 2023-09-12 ENCOUNTER — NURSE TRIAGE (OUTPATIENT)
Dept: INTERNAL MEDICINE CLINIC | Facility: CLINIC | Age: 48
End: 2023-09-12

## 2023-09-13 ENCOUNTER — OFFICE VISIT (OUTPATIENT)
Dept: INTERNAL MEDICINE CLINIC | Facility: CLINIC | Age: 48
End: 2023-09-13

## 2023-09-13 VITALS
HEIGHT: 61 IN | DIASTOLIC BLOOD PRESSURE: 82 MMHG | WEIGHT: 283.81 LBS | HEART RATE: 90 BPM | BODY MASS INDEX: 53.58 KG/M2 | SYSTOLIC BLOOD PRESSURE: 166 MMHG

## 2023-09-13 DIAGNOSIS — I10 ESSENTIAL HYPERTENSION WITH GOAL BLOOD PRESSURE LESS THAN 140/90: ICD-10-CM

## 2023-09-13 DIAGNOSIS — M25.562 ACUTE PAIN OF LEFT KNEE: Primary | ICD-10-CM

## 2023-09-13 PROCEDURE — 3077F SYST BP >= 140 MM HG: CPT | Performed by: INTERNAL MEDICINE

## 2023-09-13 PROCEDURE — 3008F BODY MASS INDEX DOCD: CPT | Performed by: INTERNAL MEDICINE

## 2023-09-13 PROCEDURE — 3079F DIAST BP 80-89 MM HG: CPT | Performed by: INTERNAL MEDICINE

## 2023-09-13 PROCEDURE — 99213 OFFICE O/P EST LOW 20 MIN: CPT | Performed by: INTERNAL MEDICINE

## 2023-09-13 RX ORDER — NAPROXEN 500 MG/1
500 TABLET ORAL 2 TIMES DAILY WITH MEALS
Qty: 30 TABLET | Refills: 1 | Status: SHIPPED | OUTPATIENT
Start: 2023-09-13 | End: 2024-09-07

## 2023-09-13 RX ORDER — AMLODIPINE BESYLATE 5 MG/1
5 TABLET ORAL DAILY
Qty: 30 TABLET | Refills: 3 | Status: SHIPPED | OUTPATIENT
Start: 2023-09-13 | End: 2024-09-07

## 2023-09-26 ENCOUNTER — TELEPHONE (OUTPATIENT)
Dept: ORTHOPEDICS CLINIC | Facility: CLINIC | Age: 48
End: 2023-09-26

## 2023-09-26 DIAGNOSIS — Z01.89 ENCOUNTER FOR LOWER EXTREMITY COMPARISON IMAGING STUDY: ICD-10-CM

## 2023-09-26 DIAGNOSIS — M25.562 LEFT KNEE PAIN, UNSPECIFIED CHRONICITY: Primary | ICD-10-CM

## 2023-09-26 NOTE — TELEPHONE ENCOUNTER
Future Appointments   Date Time Provider Ronald Shelly   10/23/2023 11:00 AM Claudine Espinoza MD [facility] for paatient to complete xray at preferred location or come in prior to appt.

## 2023-09-27 ENCOUNTER — OFFICE VISIT (OUTPATIENT)
Dept: INTERNAL MEDICINE CLINIC | Facility: CLINIC | Age: 48
End: 2023-09-27

## 2023-09-27 ENCOUNTER — NURSE TRIAGE (OUTPATIENT)
Dept: INTERNAL MEDICINE CLINIC | Facility: CLINIC | Age: 48
End: 2023-09-27

## 2023-09-27 VITALS
OXYGEN SATURATION: 98 % | HEIGHT: 61 IN | SYSTOLIC BLOOD PRESSURE: 138 MMHG | WEIGHT: 277 LBS | BODY MASS INDEX: 52.3 KG/M2 | HEART RATE: 99 BPM | DIASTOLIC BLOOD PRESSURE: 72 MMHG

## 2023-09-27 DIAGNOSIS — J06.9 VIRAL URI: Primary | ICD-10-CM

## 2023-09-27 DIAGNOSIS — B30.9 VIRAL CONJUNCTIVITIS: ICD-10-CM

## 2023-09-27 LAB
COVID19 BINAX NOW ANTIGEN: NOT DETECTED
OPERATOR ID: NORMAL

## 2023-09-27 PROCEDURE — 3008F BODY MASS INDEX DOCD: CPT

## 2023-09-27 PROCEDURE — 99214 OFFICE O/P EST MOD 30 MIN: CPT

## 2023-09-27 PROCEDURE — 3075F SYST BP GE 130 - 139MM HG: CPT

## 2023-09-27 PROCEDURE — 3078F DIAST BP <80 MM HG: CPT

## 2023-09-27 RX ORDER — OLOPATADINE HYDROCHLORIDE 1 MG/ML
1 SOLUTION/ DROPS OPHTHALMIC 2 TIMES DAILY
Qty: 5 ML | Refills: 0 | Status: SHIPPED | OUTPATIENT
Start: 2023-09-27

## 2023-09-27 NOTE — TELEPHONE ENCOUNTER
Action Requested: Summary for Provider     []  Critical Lab, Recommendations Needed  [] Need Additional Advice  []   FYI    []   Need Orders  [] Need Medications Sent to Pharmacy  []  Other     SUMMARY: Per protocol, patient should be seen in the office today. Appointment scheduled. Future Appointments   Date Time Provider Ronald Shelly   9/27/2023 12:30 PM JO ANN Evans [facility]   10/23/2023 11:00 AM Jocelyn Garrett MD [facility]   11/14/2023 12:20 PM Zak Delgado MD [facility]   12/6/2023  1:40 PM Manuel Sanchez MD [facility]     Reason for call: Pink Eye  Onset: 9/25    Patient reports of colds on Sunday and woke up the next day with her right eye crusted and goopy. States she felt fine the next day or so, however, woke up this morning again with crusted and goopy right eye. Also noticed feeling more sinus congestion in the right side. Advised to schedule an appointment for evaluation. Patient verbalized understanding and agreed with plan of care. Appointment scheduled as above.     Reason for Disposition   Patient wants to be seen    Protocols used: Eye - Pus or [id number]

## 2023-10-02 ENCOUNTER — HOSPITAL ENCOUNTER (OUTPATIENT)
Dept: GENERAL RADIOLOGY | Facility: HOSPITAL | Age: 48
Discharge: HOME | End: 2023-10-02
Attending: ORTHOPAEDIC SURGERY
Payer: COMMERCIAL

## 2023-10-02 DIAGNOSIS — M25.562 LEFT KNEE PAIN, UNSPECIFIED CHRONICITY: ICD-10-CM

## 2023-10-02 PROCEDURE — 73564 X-RAY EXAM KNEE 4 OR MORE: CPT | Performed by: ORTHOPAEDIC SURGERY

## 2023-10-16 ENCOUNTER — TELEPHONE (OUTPATIENT)
Dept: INTERNAL MEDICINE CLINIC | Facility: CLINIC | Age: 48
End: 2023-10-16

## 2023-10-16 DIAGNOSIS — Z00.00 LABORATORY EXAM ORDERED AS PART OF ROUTINE GENERAL MEDICAL EXAMINATION: Primary | ICD-10-CM

## 2023-10-16 NOTE — TELEPHONE ENCOUNTER
Patient called and is requesting lab work for her upcoming appointment on 11/14, would like to get them done before hand.

## 2023-10-23 ENCOUNTER — OFFICE VISIT (OUTPATIENT)
Dept: ORTHOPEDICS CLINIC | Facility: CLINIC | Age: 48
End: 2023-10-23
Payer: COMMERCIAL

## 2023-10-23 VITALS — HEIGHT: 61 IN | WEIGHT: 277 LBS | BODY MASS INDEX: 52.3 KG/M2

## 2023-10-23 DIAGNOSIS — E66.01 MORBID OBESITY WITH BMI OF 45.0-49.9, ADULT (HCC): ICD-10-CM

## 2023-10-23 DIAGNOSIS — M22.42 CHONDROMALACIA OF LEFT PATELLA: Primary | ICD-10-CM

## 2023-11-14 ENCOUNTER — OFFICE VISIT (OUTPATIENT)
Dept: INTERNAL MEDICINE CLINIC | Facility: CLINIC | Age: 48
End: 2023-11-14

## 2023-11-14 VITALS
SYSTOLIC BLOOD PRESSURE: 139 MMHG | HEART RATE: 82 BPM | OXYGEN SATURATION: 98 % | HEIGHT: 61 IN | BODY MASS INDEX: 51.16 KG/M2 | RESPIRATION RATE: 18 BRPM | WEIGHT: 271 LBS | DIASTOLIC BLOOD PRESSURE: 82 MMHG

## 2023-11-14 DIAGNOSIS — Z00.00 PHYSICAL EXAM, ANNUAL: Primary | ICD-10-CM

## 2023-11-14 DIAGNOSIS — Z12.11 COLON CANCER SCREENING: ICD-10-CM

## 2023-11-14 DIAGNOSIS — Z12.31 SCREENING MAMMOGRAM, ENCOUNTER FOR: ICD-10-CM

## 2023-11-14 DIAGNOSIS — I10 ESSENTIAL HYPERTENSION WITH GOAL BLOOD PRESSURE LESS THAN 140/90: ICD-10-CM

## 2023-11-14 PROCEDURE — 3079F DIAST BP 80-89 MM HG: CPT | Performed by: INTERNAL MEDICINE

## 2023-11-14 PROCEDURE — 99396 PREV VISIT EST AGE 40-64: CPT | Performed by: INTERNAL MEDICINE

## 2023-11-14 PROCEDURE — 3075F SYST BP GE 130 - 139MM HG: CPT | Performed by: INTERNAL MEDICINE

## 2023-11-14 PROCEDURE — 3008F BODY MASS INDEX DOCD: CPT | Performed by: INTERNAL MEDICINE

## 2023-11-14 RX ORDER — AMLODIPINE BESYLATE 5 MG/1
5 TABLET ORAL 2 TIMES DAILY
Qty: 180 TABLET | Refills: 3 | Status: SHIPPED | OUTPATIENT
Start: 2023-11-14 | End: 2024-11-08

## 2023-12-06 ENCOUNTER — OFFICE VISIT (OUTPATIENT)
Dept: OBGYN CLINIC | Facility: CLINIC | Age: 48
End: 2023-12-06

## 2023-12-06 ENCOUNTER — LAB ENCOUNTER (OUTPATIENT)
Dept: LAB | Facility: HOSPITAL | Age: 48
End: 2023-12-06
Attending: OBSTETRICS & GYNECOLOGY
Payer: COMMERCIAL

## 2023-12-06 VITALS
HEIGHT: 60.7 IN | WEIGHT: 273 LBS | HEART RATE: 97 BPM | DIASTOLIC BLOOD PRESSURE: 77 MMHG | SYSTOLIC BLOOD PRESSURE: 120 MMHG | BODY MASS INDEX: 52.21 KG/M2

## 2023-12-06 DIAGNOSIS — N39.498 OTHER URINARY INCONTINENCE: ICD-10-CM

## 2023-12-06 DIAGNOSIS — Z01.419 ENCOUNTER FOR GYNECOLOGICAL EXAMINATION WITHOUT ABNORMAL FINDING: Primary | ICD-10-CM

## 2023-12-06 LAB
BILIRUB UR QL: NEGATIVE
CLARITY UR: CLEAR
GLUCOSE UR-MCNC: NORMAL MG/DL
HGB UR QL STRIP.AUTO: NEGATIVE
KETONES UR-MCNC: NEGATIVE MG/DL
LEUKOCYTE ESTERASE UR QL STRIP.AUTO: NEGATIVE
NITRITE UR QL STRIP.AUTO: NEGATIVE
PH UR: 5.5 [PH] (ref 5–8)
PROT UR-MCNC: NEGATIVE MG/DL
SP GR UR STRIP: 1.02 (ref 1–1.03)
UROBILINOGEN UR STRIP-ACNC: NORMAL

## 2023-12-06 PROCEDURE — 3074F SYST BP LT 130 MM HG: CPT | Performed by: OBSTETRICS & GYNECOLOGY

## 2023-12-06 PROCEDURE — 3008F BODY MASS INDEX DOCD: CPT | Performed by: OBSTETRICS & GYNECOLOGY

## 2023-12-06 PROCEDURE — 3078F DIAST BP <80 MM HG: CPT | Performed by: OBSTETRICS & GYNECOLOGY

## 2023-12-06 PROCEDURE — 99396 PREV VISIT EST AGE 40-64: CPT | Performed by: OBSTETRICS & GYNECOLOGY

## 2023-12-06 PROCEDURE — 81003 URINALYSIS AUTO W/O SCOPE: CPT

## 2023-12-06 NOTE — PROGRESS NOTES
Safia Horton is a 50year old female [id number] Patient's last menstrual period was 11/10/2023 (approximate). Chief Complaint   Patient presents with    [address] [place] [address]   .     Her cycles are  regular. She now has almost constant urine leakage throughout the day- she wears a pad every day- she states is spontaneous and not always associated with stress or movement. I rec that she see urogyne and contact info given for Dr Tree Ford. She has a bump on her rectal area- nonpainful- discussed prob hemorrhoid. Needs refill of her progestin only ocps. OBSTETRICS HISTORY:  OB History    Para Term  AB Living   3 3 3 0 0 3   SAB IAB Ectopic Multiple Live Births   0 0 0 0 3       GYNE HISTORY:   Hx Prior Abnormal Pap: No  Pap Date: 22  Pap Result Notes: PAP/HPV NEG  Follow Up Recommendation: MAMMO 19 SAMMIE NEG      MEDICAL HISTORY:  Past Medical History:   Diagnosis Date    De Quervain's syndrome (tenosynovitis)     right hand - release,     Depression     Environmental allergies     Essential hypertension     Obesity, unspecified     TROY (obstructive sleep apnea) 10/10/19 HST    AHI 5 Sao2 Sanjay 86%    Panic anxiety syndrome 2002    Pregnancy 2005,2009, 2010    Varicose veins      Past Surgical History:   Procedure Laterality Date      (9 hr. labor); (8 hr. labor);     SURGERY - EXTERNAL Right     Wrist Tendon         SOCIAL HISTORY:  Social History     Socioeconomic History    Marital status:    Tobacco Use    Smoking status: Never     Passive exposure: Never    Smokeless tobacco: Never    Tobacco comments:     No Household Smokers, Parents smoked during her childhood. Vaping Use    Vaping Use: Never used   Substance and Sexual Activity    Alcohol use:  Yes     Alcohol/week: 0.0 standard drinks of alcohol     Comment: wine, socially    Drug use: No     Comment: NONE    Sexual activity: Yes     Birth control/protection: OCP   Other Topics Concern Caffeine Concern Yes     Comment: coffee/tea - 1cup/day        FAMILY HISTORY:  Family History   Problem Relation Age of Onset    Lipids Father     Heart Disease Father         premature CAD - stent    Heart Disorder Father 62        coronary angioplasty    Carotid stenosis Father         endarterectomy    Other (Other) Mother         overweight    Breast Cancer Maternal Grandmother [de-identified]    Diabetes Maternal Grandfather     Heart Disease Maternal Grandfather         CAD    Hypertension Sister     Obesity Sister     Other ([place]) Sister     Hypertension Brother     Hypertension Brother     Asthma Sister     Hypertension Sister     Other (Other) Brother         gout    Cancer Paternal Grandmother         splenic    Depression Maternal Uncle        MEDICATIONS:    Current Outpatient Medications:     amLODIPine 5 MG Oral Tab, Take 1 tablet (5 mg total) by mouth in the morning and 1 tablet (5 mg total) before bedtime. , Disp: 180 tablet, Rfl: 3    MONTELUKAST 10 MG Oral Tab, TAKE 1 TABLET(10 MG) BY MOUTH EVERY NIGHT, Disp: 90 tablet, Rfl: 2    LEVOCETIRIZINE 5 MG Oral Tab, TAKE 1 TABLET(5 MG) BY MOUTH EVERY NIGHT, Disp: 90 tablet, Rfl: 2    albuterol (PROAIR HFA) 108 (90 Base) MCG/ACT Inhalation Aero Soln, Inhale 2 puffs into the lungs every 6 (six) hours as needed for Wheezing., Disp: 1 each, Rfl: 0    Cholecalciferol (VITAMIN D3) 25 MCG (1000 UT) Oral Cap, Take 1 tablet by mouth daily. , Disp: , Rfl:     ALLERGIES:    Allergies   Allergen Reactions    Azithromycin HIVES     Other reaction(s): Hives    Dog Epithelium OTHER (SEE COMMENTS)     Dogs and cats     Pollen      Other reaction(s): sneezing       Blood pressure 120/77, pulse 97, height 5' 0.7\" (1.542 m), weight 273 lb (123.8 kg), last menstrual period 11/10/2023, not currently breastfeeding.     Review of Systems:  Constitutional:  Denies fatigue, night sweats, hot flashes  Eyes:  denies blurred or double vision  Cardiovascular:  denies chest pain or palpitations  Respiratory:  denies shortness of breath  Gastrointestinal:  denies heartburn, abdominal pain, diarrhea or constipation  Genitourinary:  denies dysuria, incontinence, abnormal vaginal discharge, vaginal itching  Musculoskeletal:  denies back pain. Skin/Breast:  Denies any breast pain, lumps, or discharge. Neurological:  denies headaches, extremity weakness or numbness. Psychiatric: denies depression or anxiety. Endocrine:   denies excessive thirst or urination. Heme/Lymph:  denies history of anemia, easy bruising or bleeding. Depression Screening (PHQ-2/PHQ-9): Over the LAST 2 WEEKS   Little interest or pleasure in doing things (over the last two weeks)?: Not at all    Feeling down, depressed, or hopeless (over the last two weeks)?: Not at all    PHQ-2 SCORE: 0           PHYSICAL EXAM:   Constitutional: well developed, well nourished  Head/Face: normocephalic  Neck/Thyroid: thyroid symmetric, no thyromegaly, no nodules, no adenopathy  Lymphatic:no abnormal supraclavicular or axillary adenopathy is noted  Breast: normal without palpable masses, tenderness, asymmetry, nipple discharge, nipple retraction or skin changes  Respiratory:  lungs clear to auscultation bilaterally  Cardiovascular: regular rate and rhythm, no significant murmur  Abdomen:  soft, nontender, nondistended, no masses  Skin/Hair: no unusual rashes or bruises  Extremities: no edema, no cyanosis  Psychiatric:  Oriented to time, place, person and situation.  Appropriate mood and affect    Pelvic Exam:  External Genitalia: normal appearance, hair distribution, and no lesions  Urethral Meatus:  normal in size, location, without lesions and prolapse  Bladder:  No fullness, masses or tenderness  Vagina:  Normal appearance without lesions, no abnormal discharge  Cervix:  Normal without tenderness on motion  Uterus: normal in size, contour, position, mobility, without tenderness  Adnexa: normal without masses or tenderness  Perineum: normal  Anus: small collapsed hemorrhoid    Assessment & Plan:    Encounter Diagnoses   Name Primary?     Encounter for gynecological examination without abnormal finding Yes    Other urinary incontinence      ASCCP guidelines discussed,cotest done- [id number] in 3 years, rtc 1 year for annual exam   SBE encouraged  Orders Placed This Encounter   Procedures    Urinalysis with Culture Reflex       Requested Prescriptions      No prescriptions requested or ordered in this encounter       None

## 2023-12-10 ENCOUNTER — PATIENT MESSAGE (OUTPATIENT)
Dept: OBGYN CLINIC | Facility: CLINIC | Age: 48
End: 2023-12-10

## 2023-12-11 RX ORDER — ACETAMINOPHEN AND CODEINE PHOSPHATE 120; 12 MG/5ML; MG/5ML
0.35 SOLUTION ORAL DAILY
Qty: 84 TABLET | Refills: 3 | Status: SHIPPED | OUTPATIENT
Start: 2023-12-11 | End: 2024-12-10

## 2024-02-01 ENCOUNTER — LAB ENCOUNTER (OUTPATIENT)
Dept: LAB | Facility: HOSPITAL | Age: 49
End: 2024-02-01
Payer: COMMERCIAL

## 2024-02-01 DIAGNOSIS — Z00.00 LABORATORY EXAM ORDERED AS PART OF ROUTINE GENERAL MEDICAL EXAMINATION: ICD-10-CM

## 2024-02-01 LAB
ALBUMIN SERPL-MCNC: 4.4 G/DL (ref 3.2–4.8)
ALBUMIN/GLOB SERPL: 1.3 {RATIO} (ref 1–2)
ALP LIVER SERPL-CCNC: 84 U/L
ALT SERPL-CCNC: 26 U/L
ANION GAP SERPL CALC-SCNC: 9 MMOL/L (ref 0–18)
AST SERPL-CCNC: 20 U/L (ref ?–34)
BASOPHILS # BLD AUTO: 0.04 X10(3) UL (ref 0–0.2)
BASOPHILS NFR BLD AUTO: 0.5 %
BILIRUB SERPL-MCNC: 0.5 MG/DL (ref 0.3–1.2)
BUN BLD-MCNC: 12 MG/DL (ref 9–23)
BUN/CREAT SERPL: 17.1 (ref 10–20)
CALCIUM BLD-MCNC: 9.2 MG/DL (ref 8.7–10.4)
CHLORIDE SERPL-SCNC: 106 MMOL/L (ref 98–112)
CHOLEST SERPL-MCNC: 171 MG/DL (ref ?–200)
CO2 SERPL-SCNC: 26 MMOL/L (ref 21–32)
CREAT BLD-MCNC: 0.7 MG/DL
DEPRECATED RDW RBC AUTO: 41.6 FL (ref 35.1–46.3)
EGFRCR SERPLBLD CKD-EPI 2021: 107 ML/MIN/1.73M2 (ref 60–?)
EOSINOPHIL # BLD AUTO: 0.25 X10(3) UL (ref 0–0.7)
EOSINOPHIL NFR BLD AUTO: 3 %
ERYTHROCYTE [DISTWIDTH] IN BLOOD BY AUTOMATED COUNT: 13.2 % (ref 11–15)
FASTING PATIENT LIPID ANSWER: YES
FASTING STATUS PATIENT QL REPORTED: YES
GLOBULIN PLAS-MCNC: 3.4 G/DL (ref 2.8–4.4)
GLUCOSE BLD-MCNC: 89 MG/DL (ref 70–99)
HCT VFR BLD AUTO: 38.1 %
HDLC SERPL-MCNC: 42 MG/DL (ref 40–59)
HGB BLD-MCNC: 12.4 G/DL
IMM GRANULOCYTES # BLD AUTO: 0.02 X10(3) UL (ref 0–1)
IMM GRANULOCYTES NFR BLD: 0.2 %
LDLC SERPL CALC-MCNC: 105 MG/DL (ref ?–100)
LYMPHOCYTES # BLD AUTO: 2.21 X10(3) UL (ref 1–4)
LYMPHOCYTES NFR BLD AUTO: 26.4 %
MCH RBC QN AUTO: 28.2 PG (ref 26–34)
MCHC RBC AUTO-ENTMCNC: 32.5 G/DL (ref 31–37)
MCV RBC AUTO: 86.8 FL
MONOCYTES # BLD AUTO: 0.55 X10(3) UL (ref 0.1–1)
MONOCYTES NFR BLD AUTO: 6.6 %
NEUTROPHILS # BLD AUTO: 5.3 X10 (3) UL (ref 1.5–7.7)
NEUTROPHILS # BLD AUTO: 5.3 X10(3) UL (ref 1.5–7.7)
NEUTROPHILS NFR BLD AUTO: 63.3 %
NONHDLC SERPL-MCNC: 129 MG/DL (ref ?–130)
OSMOLALITY SERPL CALC.SUM OF ELEC: 291 MOSM/KG (ref 275–295)
PLATELET # BLD AUTO: 346 10(3)UL (ref 150–450)
POTASSIUM SERPL-SCNC: 3.9 MMOL/L (ref 3.5–5.1)
PROT SERPL-MCNC: 7.8 G/DL (ref 5.7–8.2)
RBC # BLD AUTO: 4.39 X10(6)UL
SODIUM SERPL-SCNC: 141 MMOL/L (ref 136–145)
TRIGL SERPL-MCNC: 134 MG/DL (ref 30–149)
TSI SER-ACNC: 2.38 MIU/ML (ref 0.55–4.78)
VIT D+METAB SERPL-MCNC: 26.9 NG/ML (ref 30–100)
VLDLC SERPL CALC-MCNC: 23 MG/DL (ref 0–30)
WBC # BLD AUTO: 8.4 X10(3) UL (ref 4–11)

## 2024-02-01 PROCEDURE — 85025 COMPLETE CBC W/AUTO DIFF WBC: CPT

## 2024-02-01 PROCEDURE — 80053 COMPREHEN METABOLIC PANEL: CPT

## 2024-02-01 PROCEDURE — 36415 COLL VENOUS BLD VENIPUNCTURE: CPT

## 2024-02-01 PROCEDURE — 84443 ASSAY THYROID STIM HORMONE: CPT

## 2024-02-01 PROCEDURE — 82306 VITAMIN D 25 HYDROXY: CPT

## 2024-02-01 PROCEDURE — 80061 LIPID PANEL: CPT

## 2024-03-20 ENCOUNTER — PATIENT MESSAGE (OUTPATIENT)
Dept: OBGYN CLINIC | Facility: CLINIC | Age: 49
End: 2024-03-20

## 2024-03-20 DIAGNOSIS — R32 URINARY INCONTINENCE, UNSPECIFIED TYPE: Primary | ICD-10-CM

## 2024-03-20 NOTE — TELEPHONE ENCOUNTER
Pt saw [facility] on 12/6/23 and notes state:   She now has almost constant urine leakage throughout the day- she wears a pad every day- she states is spontaneous and not always associated with stress or movement. I rec that she see urogyne and contact info given for Dr Skelton.     Referral placed for Dr Skelton and pt provided with contact info via my chart.    [>50% of Time Spent on Counseling and Coordination of Care for  ___] : Greater than 50% of the encounter time was spent on counseling and coordination of care for [unfilled] [Time Spent: ___ minutes] : I have spent [unfilled] minutes of face to face time with the patient

## 2024-03-20 NOTE — TELEPHONE ENCOUNTER
From: Guerline Iyer  To: Claire Dahl  Sent: 3/20/2024 8:19 AM CDT  Subject: Referral for a Urologist     April Dahl,   Can you please send me the name of a recommended urologist for the incontinence issue I’ve been having. I do believe with my insurance I can go to any Dr. that will accept [employer] PPO. Thank you   ~Guerline Iyer  [phone]

## 2024-05-10 ENCOUNTER — OFFICE VISIT (OUTPATIENT)
Dept: UROLOGY | Facility: HOSPITAL | Age: 49
End: 2024-05-10
Attending: OBSTETRICS & GYNECOLOGY
Payer: COMMERCIAL

## 2024-05-10 VITALS — BODY MASS INDEX: 52.21 KG/M2 | HEIGHT: 60.7 IN | WEIGHT: 273 LBS | RESPIRATION RATE: 18 BRPM

## 2024-05-10 DIAGNOSIS — N81.84 PELVIC MUSCLE WASTING: ICD-10-CM

## 2024-05-10 DIAGNOSIS — N81.11 CYSTOCELE, MIDLINE: ICD-10-CM

## 2024-05-10 DIAGNOSIS — N81.6 RECTOCELE: ICD-10-CM

## 2024-05-10 DIAGNOSIS — N39.41 URGE INCONTINENCE: ICD-10-CM

## 2024-05-10 DIAGNOSIS — N39.3 FEMALE STRESS INCONTINENCE: Primary | ICD-10-CM

## 2024-05-10 LAB
BLOOD URINE: NEGATIVE
CONTROL RUN WITHIN 24 HOURS?: YES
LEUKOCYTE ESTERASE URINE: NEGATIVE
NITRITE URINE: NEGATIVE

## 2024-05-10 PROCEDURE — 99212 OFFICE O/P EST SF 10 MIN: CPT

## 2024-05-10 PROCEDURE — 81002 URINALYSIS NONAUTO W/O SCOPE: CPT | Performed by: OBSTETRICS & GYNECOLOGY

## 2024-05-10 PROCEDURE — 87086 URINE CULTURE/COLONY COUNT: CPT | Performed by: OBSTETRICS & GYNECOLOGY

## 2024-05-10 NOTE — PROGRESS NOTES
Irena Skelton,   5/10/2024     Referred by Dr. Dahl  Pt here with self    Chief Complaint   Patient presents with    Incontinence       HPI:  +BEN  +UUI  Nocturia x0  No prolapse sx  Occas dyspareunia, +coital incontinence  Reg bowels    PRIOR TREATMENTS:    Kegels    no UTIs  No gross hematuria    , vdx3  Completed childbearing  Reg cycles    Vitals:  Resp 18   Ht 60.7\"   Wt 273 lb (123.8 kg)   LMP 11/10/2023 (Approximate)   BMI 52.09 kg/m²      HISTORY:  Past Medical History:    De Quervain's syndrome (tenosynovitis)    right hand - release, 2014    Depression    Environmental allergies    Essential hypertension    Obesity, unspecified    TROY (obstructive sleep apnea)    AHI 5 Sao2 Sanjay 86%    Panic anxiety syndrome    Pregnancy (HCC)    Varicose veins      Past Surgical History:   Procedure Laterality Date      (9 hr. labor); (8 hr. labor);     Surgery - external Right     Wrist Tendon      Family History   Problem Relation Age of Onset    Lipids Father     Heart Disease Father         premature CAD - stent    Heart Disorder Father 57        coronary angioplasty    Carotid stenosis Father         endarterectomy    Other (Other) Mother         overweight    Breast Cancer Maternal Grandmother 80    Diabetes Maternal Grandfather     Heart Disease Maternal Grandfather         CAD    Hypertension Sister     Obesity Sister     Other ([place]) Sister     Hypertension Brother     Hypertension Brother     Asthma Sister     Hypertension Sister     Other (Other) Brother         gout    Cancer Paternal Grandmother         splenic    Depression Maternal Uncle       Social History     Socioeconomic History    Marital status:    Tobacco Use    Smoking status: Never     Passive exposure: Never    Smokeless tobacco: Never    Tobacco comments:     No Household Smokers, Parents smoked during her childhood.    Vaping Use    Vaping status: Never Used   Substance and Sexual Activity    Alcohol use: Yes      Alcohol/week: 0.0 standard drinks of alcohol     Comment: wine, socially    Drug use: No     Comment: NONE    Sexual activity: Yes     Birth control/protection: OCP   Other Topics Concern    Caffeine Concern Yes     Comment: coffee/tea - 1cup/day        Allergies:  Allergies   Allergen Reactions    Azithromycin HIVES     Other reaction(s): Hives    Dog Epithelium OTHER (SEE COMMENTS)     Dogs and cats     Pollen      Other reaction(s): sneezing       Medications:  Outpatient Medications Prior to Visit   Medication Sig Dispense Refill    Norethindrone (HAROON-BE) 0.35 MG Oral Tab Take 1 tablet (0.35 mg total) by mouth daily. 84 tablet 3    amLODIPine 5 MG Oral Tab Take 1 tablet (5 mg total) by mouth in the morning and 1 tablet (5 mg total) before bedtime. 180 tablet 3    MONTELUKAST 10 MG Oral Tab TAKE 1 TABLET(10 MG) BY MOUTH EVERY NIGHT 90 tablet 2    LEVOCETIRIZINE 5 MG Oral Tab TAKE 1 TABLET(5 MG) BY MOUTH EVERY NIGHT 90 tablet 2    albuterol (PROAIR HFA) 108 (90 Base) MCG/ACT Inhalation Aero Soln Inhale 2 puffs into the lungs every 6 (six) hours as needed for Wheezing. 1 each 0    Cholecalciferol (VITAMIN D3) 25 MCG (1000 UT) Oral Cap Take 1 tablet by mouth daily.       No facility-administered medications prior to visit.       Urogynecology Summary:  Urogynecology Summary  Prolapse: No  BEN: Yes  Urge Incontinence: Yes  Nocturia Frequency: 0  Frequency: Greater than 3 hours  Incomplete emptying: No  Constipation: No  Wears pad day?:  (2-3)  Wears Pad Night?: 1 (wakes up dry)  Currently Sexually Active: Yes (leakage with coitus)    Review of Systems:    A comprehensive 12 point review of systems was completed.  Pertinent positives noted in the the HPI.  No CP  No SOB    GENERAL EXAM:  GENERAL:  Alert and Oriented, and NAD  HEENT:  Normal, no lesions  LUNGS:  Normal effort  HEART:  RRR  ABDOMEN: soft, obese  EXTREM:  Normal, no edema  SKIN:  Normal, no lesions    PELVIC EXAM:  Ext. Gen: no atrophy, no  lesions  Urethra: no atrophy, nontender  Bladder:+fullness, nontender  Vagina: no atrophy  Cervix: no bleeding, no lesions, nontender  Uterus: +mobile  Adnexa:no masses, nontender  Perineum: nontender  Anus: wnl  Rectum: defer    PELVIS FLOOR NEUROMUSCULAR FUNCTION:  Strength:  1 and Unable to hold greater than 3 sec  Perineal Sensation:  Normal      PELVIC SUPPORT:  [place]:  1  Ant:  2  Post:  2  CST:  positive  UVJ: +hypermobile    Pt examined with Tanesha bruno    Impression/Plan:    ICD-10-CM    1. Female stress incontinence  N39.3 PHYSICAL THERAPY - External Location      2. Urge incontinence  N39.41 POCT urinalysis dipstick[25940]     Urine Culture, Routine     PHYSICAL THERAPY - External Location      3. Pelvic muscle wasting  N81.84       4. Cystocele, midline  N81.11       5. Rectocele  N81.6           Discussion Items:   Behavioral and pharmacologic treatments for OAB  Nonsurgical and surgical treatments for Stress Urinary Incontinence  Pelvic muscle rehabilitation including pelvic floor PT  Discussed dietary and behavioral modification, discussed pharmacologic and nonpharmacologic mgmt options for urinary symptoms. Discussed dietary & weight management with potential improvements in symptoms with weight loss.    Discussed dietary and behavioral modifications for mgmt of urinary symptoms  Discussed weight management and benefits of weight loss on urinary symptoms  Reviewed AUA/SUFU guidelines on mgmt of non-neurogenic OAB  Discussed pharmacologic and nonpharmacologic mgmt options of urinary symptoms - reviewed risks, benefits, alternatives, and goals of treatment  Discussed specific risks related to OAB meds including, but not limited to dry mouth, constipation, blurry vision, cognitive changes, and BP elevation.    Discussed management options for BEN including expectant management, pelvic floor PT, pessary, and surgery  Discussed risks and benefits associated with each option      Diagnostic  Items:  Urine testing    Medications Discussed:  OAB meds    Treatment Plan, Non-surgical:   RN teaching/pt education done  Pessary trial & PFPT    Treatment Plan, Surgical: mentioned  Mid-urethral slings (Trans Vaginal Taping, TOT, single-incision)    Pt verbalizes understanding of all above discussed information. All questions answered. She agrees to plan    Return for pessary trial, PT w/ f/u.    Irena Skelton, DO, FACOG, FACS      Discussion undertaken in English, info provided      The 21st Century Cures Act makes medical notes like these available to patients in the interest of transparency. However, be advised this is a medical document. It is intended as peer to peer communication. It is written in medical language and may contain abbreviations or verbiage that are unfamiliar. It may appear blunt or direct. Medical documents are intended to carry relevant information, facts as evident, and the clinical opinion of the practitioner.

## 2024-05-17 ENCOUNTER — OFFICE VISIT (OUTPATIENT)
Dept: UROLOGY | Facility: HOSPITAL | Age: 49
End: 2024-05-17
Attending: PHYSICIAN ASSISTANT

## 2024-05-17 VITALS — TEMPERATURE: 99 F | WEIGHT: 273 LBS | BODY MASS INDEX: 52.21 KG/M2 | HEIGHT: 60.7 IN

## 2024-05-17 DIAGNOSIS — N81.84 PELVIC MUSCLE WASTING: ICD-10-CM

## 2024-05-17 DIAGNOSIS — N81.2 UTEROVAGINAL PROLAPSE, INCOMPLETE: ICD-10-CM

## 2024-05-17 DIAGNOSIS — N39.3 FEMALE STRESS INCONTINENCE: Primary | ICD-10-CM

## 2024-05-17 PROCEDURE — 99212 OFFICE O/P EST SF 10 MIN: CPT

## 2024-05-17 PROCEDURE — 57160 INSERT PESSARY/OTHER DEVICE: CPT | Performed by: PHYSICIAN ASSISTANT

## 2024-05-17 NOTE — PROGRESS NOTES
Pt here for pessary trial due to BEN, bulge  Wants to exhaust conservative options  +UI complaints  +BEN complaints  Denies UTI's  Bowels regular  Plan to start PFPT this summer     She is not currently interested in surgical management of her pelvic organ prolapse.  Wants to try home care    Vitals:  Vitals:    05/17/24 1232   Temp: 98.8 °F (37.1 °C)       GENERAL EXAM:  GENERAL:  NAD  HEAD: NC/AT  EYES: symmetric bilaterally. No icterus. Sclera w/o injection  NECK: no masses  LUNGS:  Normal resp effort  ABDOMEN:  Soft, no mass  MUSK: normal gait, ROM wnl. No edema  PSYCH: A&Ox3. Recent and remote memory intact    PELVIC EXAM: Susannah/KEESHA Harvey, present for exam as a chaperone  Ext. Gen: no atrophy, no lesions  Urethra: no atrophy, nontender  Bladder:+fullness, nontender  Vagina: no atrophy  Cervix: no bleeding, no lesions, nontender  Uterus: +mobile  Adnexa:no masses, nontender  Perineum: nontender  Anus: wnl  Rectum: defer     PELVIS FLOOR NEUROMUSCULAR FUNCTION:  Strength:  1 and Unable to hold greater than 3 sec  Perineal Sensation:  Normal        PELVIC SUPPORT:  [place]:  1  Ant:  2  Post:  2      # 3 large knob incontinence dish placed, +CST, removed  # 4 large knob incontinence dish placed, +CST, removed  # 3 ring with support with knob placed, +CST, removed  # 4 ring with support with knob placed, +CST, removed  #5 large knob incontinence dish placed, comfortable, minimal CST  Patient stated it was comfortable and supportive  Able to void freely  Patient able to demonstrate self removal and insertion     Impression/Plan:    ICD-10-CM    1. Female stress incontinence  N39.3       2. Pelvic muscle wasting  N81.84       3. Uterovaginal prolapse, incomplete  N81.2           Discussion Items:   Discussed mgmt of vulvovaginal atrophy with vaginal estrogen cream. Reviewed associated benefits, risks, alternatives, and goals. Recommend low dose twice weekly mgmt   Discussed management of pelvic organ prolapse including  but not limited to behavioral modifications, conservative options, and surgical management.   Discussed management options for BEN including expectant management, pelvic floor PT, pessary, and surgery   Discussed risks and benefits associated with each option   Discussed pessary management including benefits and risks. Discussed importance of keeping regularly scheduled pessary checks in prevention of complications related to pessary use.     Continue pessary management home care  Recommend PFPT   Bladder diet/drill  Bowel regimen reviewed  Call with s/sx of UTI, problems with pessary   All questions answered  She understands and agrees to plan      Return in about 4 weeks (around 6/14/2024) for pessary care, sooner prn.      Yesica Lopez PA-C    The 21st Century Cures Act makes medical notes like these available to patients in the interest of transparency. However, be advised this is a medical document. It is intended as peer to peer communication. It is written in medical language and may contain abbreviations or verbiage that are unfamiliar. It may appear blunt or direct. Medical documents are intended to carry relevant information, facts as evident, and the clinical opinion of the practitioner.

## 2024-05-23 ENCOUNTER — TELEPHONE (OUTPATIENT)
Dept: INTERNAL MEDICINE CLINIC | Facility: CLINIC | Age: 49
End: 2024-05-23

## 2024-05-23 RX ORDER — ACETAMINOPHEN AND CODEINE PHOSPHATE 120; 12 MG/5ML; MG/5ML
0.35 SOLUTION ORAL DAILY
Qty: 84 TABLET | Refills: 3 | OUTPATIENT
Start: 2024-05-23

## 2024-05-23 RX ORDER — ACETAMINOPHEN AND CODEINE PHOSPHATE 120; 12 MG/5ML; MG/5ML
0.35 SOLUTION ORAL DAILY
Qty: 84 TABLET | Refills: 3 | Status: CANCELLED | OUTPATIENT
Start: 2024-05-23 | End: 2025-05-23

## 2024-05-23 NOTE — PROGRESS NOTES
This is a telemedicine visit with live, interactive video and audio.     Patient understands and accepts financial responsibility for any deductible, co-insurance and/or co-pays associated with this service.    SUBJECTIVE  Has history of anxiety and panic attacks, a long time ago (maybe 20 years) was prescribed xanax and she has kept this in her purse as insurance policy in case she needs it. Does not think she has ever taken one but now planning a trip to [place] next week and she is worried about being on a plane for 8 hours. With her anxiety she has developed a fear of flying and has never taken an overseas trip before. Believes her previous xanax prescription was for once daily as needed but again does not believe she ever took one. Also states she will be travelling to [place] which is a [place] area which makes her nervous as well, does not like enclosed spaces such as valleys. Does not believe she will need xanax on the trip but would like to have it in case she needs it.    HISTORY:  Past Medical History:    De Quervain's syndrome (tenosynovitis)    right hand - release,     Depression    Environmental allergies    Essential hypertension    Obesity, unspecified    TROY (obstructive sleep apnea)    AHI 5 Sao2 Sanjay 86%    Panic anxiety syndrome    Pregnancy (HCC)    Varicose veins      Past Surgical History:   Procedure Laterality Date      (9 hr. labor); (8 hr. labor);     Surgery - external Right     Wrist Tendon      Family History   Problem Relation Age of Onset    Lipids Father     Heart Disease Father         premature CAD - stent    Heart Disorder Father 57        coronary angioplasty    Carotid stenosis Father         endarterectomy    Other (Other) Mother         overweight    Breast Cancer Maternal Grandmother 80    Diabetes Maternal Grandfather     Heart Disease Maternal Grandfather         CAD    Hypertension Sister     Obesity Sister     Other ([place]) Sister     Hypertension  Brother     Hypertension Brother     Asthma Sister     Hypertension Sister     Other (Other) Brother         gout    Cancer Paternal Grandmother         splenic    Depression Maternal Uncle       Social History     Socioeconomic History    Marital status:    Tobacco Use    Smoking status: Never     Passive exposure: Never    Smokeless tobacco: Never    Tobacco comments:     No Household Smokers, Parents smoked during her childhood.    Vaping Use    Vaping status: Never Used   Substance and Sexual Activity    Alcohol use: Yes     Alcohol/week: 0.0 standard drinks of alcohol     Comment: wine, socially    Drug use: No     Comment: NONE    Sexual activity: Yes     Birth control/protection: OCP   Other Topics Concern    Caffeine Concern Yes     Comment: coffee/tea - 1cup/day        Allergies   Allergen Reactions    Azithromycin HIVES     Other reaction(s): Hives    Dog Epithelium OTHER (SEE COMMENTS)     Dogs and cats     Pollen      Other reaction(s): sneezing      Current Outpatient Medications   Medication Sig Dispense Refill    ALPRAZolam (XANAX) 0.25 MG Oral Tab Take 1 tablet (0.25 mg total) by mouth daily as needed for Anxiety. May repeat dose in 1 hour prior to flight 20 tablet 0    Norethindrone (HAROON-BE) 0.35 MG Oral Tab Take 1 tablet (0.35 mg total) by mouth daily. 84 tablet 3    amLODIPine 5 MG Oral Tab Take 1 tablet (5 mg total) by mouth in the morning and 1 tablet (5 mg total) before bedtime. 180 tablet 3    MONTELUKAST 10 MG Oral Tab TAKE 1 TABLET(10 MG) BY MOUTH EVERY NIGHT 90 tablet 2    LEVOCETIRIZINE 5 MG Oral Tab TAKE 1 TABLET(5 MG) BY MOUTH EVERY NIGHT 90 tablet 2    albuterol (PROAIR HFA) 108 (90 Base) MCG/ACT Inhalation Aero Soln Inhale 2 puffs into the lungs every 6 (six) hours as needed for Wheezing. 1 each 0    Cholecalciferol (VITAMIN D3) 25 MCG (1000 UT) Oral Cap Take 1 tablet by mouth daily.       OBJECTIVE  Physical Exam:   alert, appears stated age, cooperative, and no distress and  age appropriate and person, place, and time/date  Calm and speaking in complete sentences, logical    ASSESSMENT & PLAN  Diagnoses and all orders for this visit:    Fear of flying  -     ALPRAZolam (XANAX) 0.25 MG Oral Tab; Take 1 tablet (0.25 mg total) by mouth daily as needed for Anxiety. May repeat dose in 1 hour prior to flight    Anxiety  -     ALPRAZolam (XANAX) 0.25 MG Oral Tab; Take 1 tablet (0.25 mg total) by mouth daily as needed for Anxiety. May repeat dose in 1 hour prior to flight    May take 1 tablet an hour before the flight and one additional prior to takeoff or while on the flight if still anxious  Otherwise once a day as needed   Try first dose at home to monitor for any side effects/reaction    JO ANN Hunt

## 2024-05-23 NOTE — TELEPHONE ENCOUNTER
Patient states she is going to fly to [place] then [place] and in the past has taken xanax to help, this was about  20 years ago. Patient advised a video appointment to discuss. Appointment scheduled    Future Appointments   Date Time Provider Department Center   5/23/2024  1:30 PM Ericka Adame APRN [facility]   6/21/2024 12:30 PM Yesica Lopez, LULA [facility]

## 2024-06-29 ENCOUNTER — TELEPHONE (OUTPATIENT)
Dept: ALLERGY | Facility: CLINIC | Age: 49
End: 2024-06-29

## 2024-06-29 RX ORDER — LEVOCETIRIZINE DIHYDROCHLORIDE 5 MG/1
TABLET, FILM COATED ORAL
Qty: 90 TABLET | Refills: 0 | Status: SHIPPED | OUTPATIENT
Start: 2024-06-29

## 2024-06-29 RX ORDER — MONTELUKAST SODIUM 10 MG/1
TABLET ORAL
Qty: 90 TABLET | Refills: 0 | Status: SHIPPED | OUTPATIENT
Start: 2024-06-29

## 2024-06-29 NOTE — TELEPHONE ENCOUNTER
Refill requested for    Name from pharmacy: LEVOCETIRIZINE 5MG TABLETS         Will file in chart as: LEVOCETIRIZINE 5 MG Oral Tab    Sig: TAKE 1 TABLET(5 MG) BY MOUTH EVERY NIGHT    Disp: 90 tablet    Refills: 2 (Pharmacy requested: Not specified)    Start: 6/28/2024    Class: Normal    Non-formulary    Last ordered: 1 year ago (3/29/2023) by Celestine Lilly MD    Last refill: 9/28/2023    Rx #: [id number]    Antihistamines [id number]/28/2024 03:26 PM   Protocol Details Appt in past 12 mos or next 1 mos       Name from pharmacy: MONTELUKAST 10MG TABLETS         Will file in chart as: MONTELUKAST 10 MG Oral Tab    Sig: TAKE 1 TABLET(10 MG) BY MOUTH EVERY NIGHT    Disp: 90 tablet    Refills: 2 (Pharmacy requested: Not specified)    Start: 6/28/2024    Class: Normal    Non-formulary    Last ordered: 1 year ago (3/29/2023) by Celestine Lilly MD    Last refill: 9/28/2023    Rx #: [id number]    Corticosteroids / Long-Acting Bronchodilators [id number]/28/2024 03:26 PM   Protocol Details Appt in past 6 mos or next 3 mos      To be filled at: [id number] - [place], [place] - [address] AT [facility] [address], [phone], [phone]       Last office visit: 02/16/2023    Previously advised to follow up in six months- asthma on a daily controller     F/U currently scheduled? Not at this time. Seeliohart message sent that patient is overdue         ACTION: needs to schedule follow up before one refill can be sent.

## 2024-06-29 NOTE — TELEPHONE ENCOUNTER
[facility] an [facility] via [facility] for the following symptoms:    some wheezing issues.

## 2024-06-29 NOTE — TELEPHONE ENCOUNTER
RN called pt to triage pt's symptoms as she reported wheezing when she was scheduling her follow up.    Pt reports that she is feeling fine but still having some wheezing.  Pt states \"it's not bad\".  Pt speaking in clear, complete sentences, no wheezing or gasping heard.   Pt denies using albuterol--but does have it if needed.    Wheezing is not on a daily basis  Usually in the evening when she lays down  Denies it being a constant everyday feeling  Had been sick with a cough last month--still has lingering cough with some chest congestion and phlegm  Phlegm in chest--reports that it is clear  Cindy any fevers, body aches or chills  Denies any chest pain.    RN advised that she may use Albuterol every 4-6 hours as needed for wheezing or chest tightness.  RN assisted in scheduling a sooner follow up visit for 7/10  RN advised that if pt is needing to use Albuterol more often than every 4 hours and/or is not getting good enough relief to follow up in ER or Urgent Care.  Pt verbalizes understnading.    Refilled her Levocetirizine and Montelukast per protocol as she now has a follow up scheduled--last office visit on 2/16/2023.    Routed to Dr. Lilly so he is aware.

## 2024-06-29 NOTE — TELEPHONE ENCOUNTER
Call reviewed and noted.  Agree with triage advice provided.  Patient has follow-up appointment on July 10.  If increasing or worsening symptoms prior to then then recommend urgent care or ED evaluation.

## 2024-06-29 NOTE — TELEPHONE ENCOUNTER
Pt has follow up scheduled for 7/10--See documentation in MyChart Message Telephone Encounter.     Refills sent per protocol.

## 2024-07-10 ENCOUNTER — OFFICE VISIT (OUTPATIENT)
Dept: ALLERGY | Facility: CLINIC | Age: 49
End: 2024-07-10
Payer: COMMERCIAL

## 2024-07-10 VITALS — HEART RATE: 91 BPM | OXYGEN SATURATION: 97 %

## 2024-07-10 DIAGNOSIS — J45.30 MILD PERSISTENT EXTRINSIC ASTHMA WITHOUT COMPLICATION (HCC): Primary | ICD-10-CM

## 2024-07-10 DIAGNOSIS — Z23 NEED FOR COVID-19 VACCINE: ICD-10-CM

## 2024-07-10 DIAGNOSIS — Z23 FLU VACCINE NEED: ICD-10-CM

## 2024-07-10 DIAGNOSIS — Z23 NEED FOR PROPHYLACTIC VACCINATION WITH STREPTOCOCCUS PNEUMONIAE (PNEUMOCOCCUS) AND INFLUENZA VACCINES: ICD-10-CM

## 2024-07-10 DIAGNOSIS — J30.89 SEASONAL AND PERENNIAL ALLERGIC RHINOCONJUNCTIVITIS: ICD-10-CM

## 2024-07-10 DIAGNOSIS — J30.2 SEASONAL AND PERENNIAL ALLERGIC RHINOCONJUNCTIVITIS: ICD-10-CM

## 2024-07-10 DIAGNOSIS — H10.10 SEASONAL AND PERENNIAL ALLERGIC RHINOCONJUNCTIVITIS: ICD-10-CM

## 2024-07-10 PROCEDURE — 99214 OFFICE O/P EST MOD 30 MIN: CPT | Performed by: ALLERGY & IMMUNOLOGY

## 2024-07-10 RX ORDER — LEVOCETIRIZINE DIHYDROCHLORIDE 5 MG/1
5 TABLET, FILM COATED ORAL NIGHTLY
Qty: 90 TABLET | Refills: 2 | Status: SHIPPED | OUTPATIENT
Start: 2024-07-10

## 2024-07-10 RX ORDER — MONTELUKAST SODIUM 10 MG/1
10 TABLET ORAL NIGHTLY
Qty: 90 TABLET | Refills: 2 | Status: SHIPPED | OUTPATIENT
Start: 2024-07-10

## 2024-07-10 NOTE — PATIENT INSTRUCTIONS
#1 asthma  Recent intermittent wheezing.  Patient wishes to track frequency and albuterol usage over the next 2 weeks.  If symptoms are more than 2 days/week or albuterol more than 2 days/week will recommend a trial of Symbicort or Advair or Breo.  Patient to keep us posted.  No ED visits or prednisone in the interim  Albuterol 2 puffs every 4-6 hours if having active coughing wheezing or shortness of breath  Continue with singular once a day  Advised be watchful for GERD as well.  Reviewed anti-GERD measures    #2 allergic rhinitis  Continue with Singulair and Xyzal  May add Flonase or Nasacort if having refractory symptoms    #3 flu vaccine recommended in the fall    #4 COVID-vaccine booster recommended.  Reviewed I do not have the booster in my office.    #5 pneumonia vaccine recommended with Prevnar 20 given history of asthma.  Vaccine offered

## 2024-07-10 NOTE — PROGRESS NOTES
Guerline Iyer is a 48 year old female.    HPI:     Chief Complaint   Patient presents with    Asthma     Pt reports a chronic slight wheeze. Sometimes at night. Unsure if it is congestion or plegem. Does not need to use inhaler for it. Does not feel tightness in her chest when it happens. This has been ongoing probably over a year.      Patient is a 48-year-old female who presents for follow-up with a chief complaint of allergies and asthma    Prior notes visit from  2023 reviewed and appreciated.  Patient with history of asthma allergic rhinitis and GERD.    Prior allergy testing in 2019 through outside allergist Dr. Fregoso was positive to ragweed mold dust mite cat dog.  Total IgE level 1261    1 dog in the home    Immunizations reviewed.  COVID-vaccine x 2 doses last in   No flu vaccine on record.  No pneumonia vaccine on record    Today patient reports    Asthma  Active or persistent symptoms more than 2 days/week:  1+ year mild wz, worse at night  From her throat?   Comes and goes   Alb helps   Freq:  4 days per week   No ct or sob   ED visits or prednisone in the interim: denies   Active meds: Singulair albuterol prn       Ar:  Active or persistent symptoms: some rn sz  throat clearing   Active meds Singulair, Xyzal  pets 1 dog  Non-smoker    No fever or MP    Recent trip to [place]       HISTORY:  Past Medical History:    De Quervain's syndrome (tenosynovitis)    right hand - release,     Depression    Environmental allergies    Essential hypertension    Obesity, unspecified    TROY (obstructive sleep apnea)    AHI 5 Sao2 Sanjay 86%    Panic anxiety syndrome    Pregnancy (HCC)    Varicose veins      Past Surgical History:   Procedure Laterality Date      (9 hr. labor); (8 hr. labor);     Surgery - external Right     Wrist Tendon      Family History   Problem Relation Age of Onset    Lipids Father     Heart Disease Father         premature CAD - stent    Heart Disorder  Father 57        coronary angioplasty    Carotid stenosis Father         endarterectomy    Other (Other) Mother         overweight    Breast Cancer Maternal Grandmother 80    Diabetes Maternal Grandfather     Heart Disease Maternal Grandfather         CAD    Hypertension Sister     Obesity Sister     Other (layton) Sister     Hypertension Brother     Hypertension Brother     Asthma Sister     Hypertension Sister     Other (Other) Brother         gout    Cancer Paternal Grandmother         splenic    Depression Maternal Uncle       Social History:   Social History     Socioeconomic History    Marital status:    Tobacco Use    Smoking status: Never     Passive exposure: Never    Smokeless tobacco: Never    Tobacco comments:     No Household Smokers, Parents smoked during her childhood.    Vaping Use    Vaping status: Never Used   Substance and Sexual Activity    Alcohol use: Yes     Alcohol/week: 0.0 standard drinks of alcohol     Comment: wine, socially    Drug use: No     Comment: NONE    Sexual activity: Yes     Birth control/protection: OCP   Other Topics Concern    Caffeine Concern Yes     Comment: coffee/tea - 1cup/day        Medications (Active prior to today's visit):  Current Outpatient Medications   Medication Sig Dispense Refill    levocetirizine 5 MG Oral Tab TAKE 1 TABLET(5 MG) BY MOUTH EVERY NIGHT 90 tablet 0    montelukast 10 MG Oral Tab TAKE 1 TABLET(10 MG) BY MOUTH EVERY NIGHT 90 tablet 0    Norethindrone (HAROON-BE) 0.35 MG Oral Tab Take 1 tablet (0.35 mg total) by mouth daily. 84 tablet 3    amLODIPine 5 MG Oral Tab Take 1 tablet (5 mg total) by mouth in the morning and 1 tablet (5 mg total) before bedtime. 180 tablet 3    albuterol (PROAIR HFA) 108 (90 Base) MCG/ACT Inhalation Aero Soln Inhale 2 puffs into the lungs every 6 (six) hours as needed for Wheezing. 1 each 0    Cholecalciferol (VITAMIN D3) 25 MCG (1000 UT) Oral Cap Take 1 tablet by mouth daily.      ALPRAZolam (XANAX) 0.25 MG Oral Tab  Take 1 tablet (0.25 mg total) by mouth daily as needed for Anxiety. May repeat dose in 1 hour prior to flight (Patient not taking: Reported on 7/10/2024) 20 tablet 0       Allergies:  Allergies   Allergen Reactions    Azithromycin HIVES     Other reaction(s): Hives    Dog Epithelium OTHER (SEE COMMENTS)     Dogs and cats     Pollen      Other reaction(s): sneezing         ROS:   Allergic/Immuno:  See hpi  Cardiovascular:  Negative for irregular heartbeat/palpitations, chest pain, edema  Constitutional:  Negative night sweats,weight loss, irritability and lethargy  ENMT:  Negative for ear drainage, hearing loss and nasal drainage  Eyes:  Negative for eye discharge and vision loss  Gastrointestinal:  Negative for abdominal pain, diarrhea and vomiting  Integumentary:  Negative for pruritus and rash  Respiratory:  see hpi   PHYSICAL EXAM:   Constitutional: responsive, no acute distress noted  Head/Face: NC/Atraumatic  Eyes/Vision: conjunctiva and lids are normal extraocular motion is intact   Ears/Audiometry: tympanic membranes are normal bilaterally hearing is grossly intact  Nose/Mouth/Throat: nose and throat are clear mucous membranes are moist   Neck/Thyroid: neck is supple without adenopathy  Lymphatic: no abnormal cervical, supraclavicular or axillary adenopathy is noted  Respiratory: normal to inspection lungs are clear to auscultation bilaterally normal respiratory effort   Cardiovascular: regular rate and rhythm no murmurs, gallups, or rubs  Abdomen: soft non-tender non-distended  Skin/Hair: no unusual rashes present   Extremities: no edema, cyanosis, or clubbing     ASSESSMENT/PLAN:   Assessment   Encounter Diagnoses   Name Primary?    Mild persistent extrinsic asthma without complication (HCC) Yes    Seasonal and perennial allergic rhinoconjunctivitis     Flu vaccine need     Need for COVID-19 vaccine     Need for prophylactic vaccination with Streptococcus pneumoniae (Pneumococcus) and Influenza vaccines       Unable to perform spirometry due to COVID-19 pandemic     #1 asthma  Recent intermittent wheezing.  Patient wishes to track frequency and albuterol usage over the next 2 weeks.  If symptoms are more than 2 days/week or albuterol more than 2 days/week will recommend a trial of Symbicort or Advair or Breo.  Patient to keep us posted.  No ED visits or prednisone in the interim  Albuterol 2 puffs every 4-6 hours if having active coughing wheezing or shortness of breath  Continue with singular once a day  Advised be watchful for GERD as well.  Reviewed anti-GERD measures    #2 allergic rhinitis  Continue with Singulair and Xyzal  May add Flonase or Nasacort if having refractory symptoms    #3 flu vaccine recommended in the fall    #4 COVID-vaccine booster recommended.  Reviewed I do not have the booster in my office.    #5 pneumonia vaccine recommended with Prevnar 20 given history of asthma.  Vaccine offered               Orders This Visit:  No orders of the defined types were placed in this encounter.      Meds This Visit:  Requested Prescriptions      No prescriptions requested or ordered in this encounter       Imaging & Referrals:  None     7/10/2024  Celestine Lilly MD    If medication samples were provided today, they were provided solely for patient education and training related to self administration of these medications.  Teaching, instruction and sample was provided to the patient by myself.  Teaching included  a review of potential adverse side effects as well as potential efficacy.  Patient's questions were answered in regards to medication administration and dosing and potential side effects. Teaching was provided via the teach back method

## 2024-09-30 RX ORDER — LEVOCETIRIZINE DIHYDROCHLORIDE 5 MG/1
5 TABLET, FILM COATED ORAL NIGHTLY
Qty: 90 TABLET | Refills: 2 | OUTPATIENT
Start: 2024-09-30

## 2024-09-30 RX ORDER — MONTELUKAST SODIUM 10 MG/1
10 TABLET ORAL NIGHTLY
Qty: 90 TABLET | Refills: 2 | OUTPATIENT
Start: 2024-09-30

## 2024-09-30 NOTE — TELEPHONE ENCOUNTER
Patient last seen in Allergy 7/10/2024 for . . .      Mild persistent extrinsic asthma without complication (HCC) Yes     Seasonal and perennial allergic rhinoconjunctivitis      Flu vaccine need      Need for COVID-19 vaccine      Need for prophylactic vaccination with Streptococcus pneumoniae (Pneumococcus) and Influenza vaccines       Requested Prescriptions   Pending Prescriptions Disp Refills    montelukast 10 MG Oral Tab 90 tablet 2     Sig: Take 1 tablet (10 mg total) by mouth nightly.       Corticosteroids / Long-Acting Bronchodilators Passed - 9/29/2024  9:47 AM        Passed - Appt in past 6 mos or next 3 mos     Recent Outpatient Visits              2 months ago Mild persistent extrinsic asthma without complication (HCC)    [facility] Celestine Lilly MD    Office Visit    4 months ago Fear of flying    [facility] Ericka Adame, APRN    Telemedicine    4 months ago Female stress incontinence    [facility] Yesica Lopez, PA-C    Office Visit    4 months ago Female stress incontinence    [facility] [place] [facility] - [facility] Irena Skelton DO    Office Visit    9 months ago Encounter for gynecological examination without abnormal finding    [facility] - OB/GYN Claire Dahl MD    Office Visit                        levocetirizine 5 MG Oral Tab 90 tablet 2     Sig: Take 1 tablet (5 mg total) by mouth nightly.       Antihistamines Passed - 9/29/2024  9:47 AM        Passed - Appt in past 12 mos or next 1 mos     Recent Outpatient Visits              2 months ago Mild persistent extrinsic asthma without complication (HCC)    [facility]    Office Visit    4 months ago Fear of flying    [facility] Ericka Adame APRN    Telemedicine    4 months ago Female stress incontinence    [facility] - [facility]rogynecology Yesica Lopez PA-C    Office Visit    4 months ago Female stress incontinence    [facility] - [facility] Irena Skelton DO    Office Visit    9 months ago Encounter for gynecological examination without abnormal finding    [facility] - OB/GYN Claire Dahl MD    Office Visit                         Both prescriptions denied as patient was given a years worth of refills for both Montelukast and Xyzal 7/30/2024.

## 2024-11-11 RX ORDER — ACETAMINOPHEN AND CODEINE PHOSPHATE 120; 12 MG/5ML; MG/5ML
0.35 SOLUTION ORAL DAILY
Qty: 84 TABLET | Refills: 3 | OUTPATIENT
Start: 2024-11-11

## 2024-11-11 RX ORDER — ACETAMINOPHEN AND CODEINE PHOSPHATE 120; 12 MG/5ML; MG/5ML
0.35 SOLUTION ORAL DAILY
Qty: 84 TABLET | Refills: 3 | Status: CANCELLED | OUTPATIENT
Start: 2024-11-11 | End: 2025-11-11

## 2024-11-11 NOTE — TELEPHONE ENCOUNTER
Requested Prescriptions     Pending Prescriptions Disp Refills    NORETHINDRONE 0.35 MG Oral Tab [Pharmacy Med Name: NORETHINDRONE 0.35MG TABLETS 28S] 84 tablet 3     Sig: TAKE 1 TABLET(0.35 MG) BY MOUTH DAILY     Last annual 12/6/23  Last filled 12/11/23 x 1 year  Pap UTD  Mammo DUE    Next annual due Dec.

## 2024-11-13 NOTE — TELEPHONE ENCOUNTER
Requested Prescriptions     Pending Prescriptions Disp Refills    Norethindrone (HAROON-BE) 0.35 MG Oral Tab 84 tablet 3     Sig: Take 1 tablet (0.35 mg total) by mouth daily.     Last annual 12/6/23  Last filled 12/11/23 x 1 year  Pap UTD  Mammo DUE    Next annual 3/19/25

## 2024-11-18 ENCOUNTER — TELEPHONE (OUTPATIENT)
Dept: OBGYN CLINIC | Facility: CLINIC | Age: 49
End: 2024-11-18

## 2024-11-18 DIAGNOSIS — Z12.31 SCREENING MAMMOGRAM FOR BREAST CANCER: Primary | ICD-10-CM

## 2024-11-18 NOTE — TELEPHONE ENCOUNTER
Patient called states she needs a new mammogram order so that she can get her birthcontrol refilled . Patient has an appointment 3-18-25. Patient asked that some please call her

## 2024-11-18 NOTE — TELEPHONE ENCOUNTER
Last annual - 12/6/2023  Last mammogram - 5/1/2019  Annual scheduled on 3/19/2024    Mammo order placed.    Patient states she will send a [employer] message when her mammogram is scheduled.  Patient was to start her next pack of birth control yesterday and would like refills to cover until annual.

## 2024-11-23 ENCOUNTER — HOSPITAL ENCOUNTER (OUTPATIENT)
Dept: MAMMOGRAPHY | Facility: HOSPITAL | Age: 49
Discharge: HOME | End: 2024-11-23
Attending: OBSTETRICS & GYNECOLOGY
Payer: COMMERCIAL

## 2024-11-23 DIAGNOSIS — Z12.31 SCREENING MAMMOGRAM FOR BREAST CANCER: ICD-10-CM

## 2024-11-23 PROCEDURE — 77067 SCR MAMMO BI INCL CAD: CPT | Performed by: OBSTETRICS & GYNECOLOGY

## 2024-11-23 PROCEDURE — 77063 BREAST TOMOSYNTHESIS BI: CPT | Performed by: OBSTETRICS & GYNECOLOGY

## 2024-12-20 ENCOUNTER — TELEPHONE (OUTPATIENT)
Dept: OBGYN CLINIC | Facility: CLINIC | Age: 49
End: 2024-12-20

## 2024-12-20 DIAGNOSIS — Z76.0 MEDICATION REFILL: Primary | ICD-10-CM

## 2024-12-20 RX ORDER — ACETAMINOPHEN AND CODEINE PHOSPHATE 120; 12 MG/5ML; MG/5ML
0.35 SOLUTION ORAL DAILY
Qty: 28 TABLET | Refills: 3 | Status: SHIPPED | OUTPATIENT
Start: 2024-12-20 | End: 2025-12-20

## 2024-12-20 NOTE — TELEPHONE ENCOUNTER
Patient is out of birth control. Annual scheduled for 3/19. Had a mammogram done in November. Hoping to get refills to bridge the gap. Please call.

## 2024-12-20 NOTE — TELEPHONE ENCOUNTER
Mammo:UTD  Next annual: 3/19/2024  Last annual: 12/6/2023    Oral contraceptive sent to cover until annual per protocol. Patient called and informed. Pharm confirm and prescription sent.

## 2025-01-08 ENCOUNTER — TELEPHONE (OUTPATIENT)
Dept: INTERNAL MEDICINE CLINIC | Facility: CLINIC | Age: 50
End: 2025-01-08

## 2025-01-08 NOTE — TELEPHONE ENCOUNTER
Patient is calling to ask if she may use her CPAP machine that has not been used in over 2 years.

## 2025-01-08 NOTE — TELEPHONE ENCOUNTER
CPAP was ordered by Dr. Javon Gutiérrez (pulmonologist at [facility]).    Patient has not had appointment/physical with Dr. Roth since 23.    Verified name and .    Patient was advised that she can reach out to Dr. Gutiérrez's office or schedule an appointment with Dr. Roth to be evaluated and discuss need for CPAP as it has been over a year since she has been seen by Dr. Roth.    Patient states she will keep already scheduled appointment with Dr. Roth on 25.      Future Appointments   Date Time Provider Department Center   3/19/2025 11:00 AM Claire Dahl MD [facility] [place]   2025  1:40 PM Irais Roth MD [facility]       Information given:  Dr. Javon Gutiérrez  [address] [place] [id number] [phone]

## 2025-02-18 DIAGNOSIS — I10 ESSENTIAL HYPERTENSION WITH GOAL BLOOD PRESSURE LESS THAN 140/90: ICD-10-CM

## 2025-02-20 RX ORDER — AMLODIPINE BESYLATE 5 MG/1
5 TABLET ORAL 2 TIMES DAILY
Qty: 180 TABLET | Refills: 1 | Status: SHIPPED | OUTPATIENT
Start: 2025-02-20

## 2025-02-20 NOTE — TELEPHONE ENCOUNTER
Please review. Protocol Failed; No Protocol    Future Appointments   Date Time Provider Department Center   3/19/2025 11:00 AM Claire Dahl MD [facility] [place]   4/7/2025  1:40 PM Irais Roth MD [facility] [place]

## 2025-03-19 ENCOUNTER — OFFICE VISIT (OUTPATIENT)
Dept: OBGYN CLINIC | Facility: CLINIC | Age: 50
End: 2025-03-19

## 2025-03-19 VITALS
HEART RATE: 76 BPM | DIASTOLIC BLOOD PRESSURE: 83 MMHG | SYSTOLIC BLOOD PRESSURE: 138 MMHG | WEIGHT: 276 LBS | BODY MASS INDEX: 53 KG/M2

## 2025-03-19 DIAGNOSIS — N92.1 BREAKTHROUGH BLEEDING ON OCPS: ICD-10-CM

## 2025-03-19 DIAGNOSIS — Z01.419 ENCOUNTER FOR GYNECOLOGICAL EXAMINATION WITHOUT ABNORMAL FINDING: Primary | ICD-10-CM

## 2025-03-19 DIAGNOSIS — Z76.0 MEDICATION REFILL: ICD-10-CM

## 2025-03-19 DIAGNOSIS — Z12.31 VISIT FOR SCREENING MAMMOGRAM: ICD-10-CM

## 2025-03-19 PROCEDURE — 3075F SYST BP GE 130 - 139MM HG: CPT | Performed by: OBSTETRICS & GYNECOLOGY

## 2025-03-19 PROCEDURE — 99212 OFFICE O/P EST SF 10 MIN: CPT | Performed by: OBSTETRICS & GYNECOLOGY

## 2025-03-19 PROCEDURE — 3079F DIAST BP 80-89 MM HG: CPT | Performed by: OBSTETRICS & GYNECOLOGY

## 2025-03-19 PROCEDURE — 99396 PREV VISIT EST AGE 40-64: CPT | Performed by: OBSTETRICS & GYNECOLOGY

## 2025-03-19 RX ORDER — ACETAMINOPHEN AND CODEINE PHOSPHATE 120; 12 MG/5ML; MG/5ML
0.35 SOLUTION ORAL DAILY
Qty: 84 TABLET | Refills: 3 | Status: SHIPPED | OUTPATIENT
Start: 2025-03-19 | End: 2026-03-19

## 2025-03-19 NOTE — PROGRESS NOTES
Guerline Iyer is a 49 year old female  Patient's last menstrual period was 2025 (approximate).    Chief Complaint   Patient presents with    Gyn Exam     Annual, Oral contraceptive refill   .       History of Present Illness  Guerline Iyer is a 49 year old female who presents with urinary incontinence. She was referred by Dr. Skelton for evaluation of her urinary incontinence.    She experiences ongoing urinary incontinence that is not limited to activities such as straining, coughing, or sneezing. She previously attempted to use a pessary as recommended but was unable to do so. She is considering increasing her walking activity to aid in weight loss, although she is concerned about potential leakage during exercise.    She notes irregular bleeding patterns while on norepinephrine birth control pills. She recently started a new pack on  and began experiencing breakthrough bleeding the following day. This irregular bleeding has been occurring almost every month, typically during the first or second week of the pill cycle. We discussed that BTB on progestin only methods is very common but will order an ultrasound to make sure that the endometrium is normal due to body habitus.     She experiences leg cramps, which she attributes to dehydration, acknowledging poor hydration habits and often prioritizing coffee over water intake.         OBSTETRICS HISTORY:  OB History    Para Term  AB Living   3 3 3 0 0 3   SAB IAB Ectopic Multiple Live Births   0 0 0 0 3       GYNE HISTORY:   Hx Prior Abnormal Pap: No  Pap Date: 22  Pap Result Notes: PAP/HPV NEG  Follow Up Recommendation: mammo 24 bilat neg      MEDICAL HISTORY:  Past Medical History:    De Quervain's syndrome (tenosynovitis)    right hand - release, 2014    Depression    Environmental allergies    Essential hypertension    Obesity, unspecified    TROY (obstructive sleep apnea)    AHI 5 Sao2 Sanjay 86%    Panic  anxiety syndrome    Pregnancy (HCC)    Varicose veins     Past Surgical History:   Procedure Laterality Date      (9 hr. labor); (8 hr. labor);     Surgery - external Right     Wrist Tendon         SOCIAL HISTORY:  Social History     Socioeconomic History    Marital status:    Tobacco Use    Smoking status: Never     Passive exposure: Never    Smokeless tobacco: Never    Tobacco comments:     No Household Smokers, Parents smoked during her childhood.    Vaping Use    Vaping status: Never Used   Substance and Sexual Activity    Alcohol use: Yes     Alcohol/week: 0.0 standard drinks of alcohol     Comment: wine, socially    Drug use: No     Comment: NONE    Sexual activity: Yes     Birth control/protection: OCP   Other Topics Concern    Caffeine Concern Yes     Comment: coffee/tea - 1cup/day        FAMILY HISTORY:  Family History   Problem Relation Age of Onset    Other (Other) Mother         overweight    Lipids Father     Heart Disease Father         premature CAD - stent    Heart Disorder Father 57        coronary angioplasty    Carotid stenosis Father         endarterectomy    Hypertension Sister     Obesity Sister     Other (layton) Sister     Asthma Sister     Hypertension Sister     Hypertension Brother     Hypertension Brother     Other (Other) Brother         gout    Breast Cancer Maternal Grandmother 80    Diabetes Maternal Grandfather     Heart Disease Maternal Grandfather         CAD    Cancer Paternal Grandmother         splenic    Depression Maternal Uncle     Ovarian Cancer Neg     Pancreatic Cancer Neg     Prostate Cancer Neg        MEDICATIONS:    Current Outpatient Medications:     Norethindrone (HAROON-BE) 0.35 MG Oral Tab, Take 1 tablet (0.35 mg total) by mouth daily., Disp: 84 tablet, Rfl: 3    amLODIPine 5 MG Oral Tab, Take 1 tablet (5 mg total) by mouth 2 (two) times daily., Disp: 180 tablet, Rfl: 1    montelukast 10 MG Oral Tab, Take 1 tablet (10 mg total) by mouth nightly.,  Disp: 90 tablet, Rfl: 2    levocetirizine 5 MG Oral Tab, Take 1 tablet (5 mg total) by mouth nightly., Disp: 90 tablet, Rfl: 2    albuterol (PROAIR HFA) 108 (90 Base) MCG/ACT Inhalation Aero Soln, Inhale 2 puffs into the lungs every 6 (six) hours as needed for Wheezing., Disp: 1 each, Rfl: 0    Cholecalciferol (VITAMIN D3) 25 MCG (1000 UT) Oral Cap, Take 1 tablet by mouth daily., Disp: , Rfl:     ALPRAZolam (XANAX) 0.25 MG Oral Tab, Take 1 tablet (0.25 mg total) by mouth daily as needed for Anxiety. May repeat dose in 1 hour prior to flight (Patient not taking: Reported on 3/19/2025), Disp: 20 tablet, Rfl: 0    ALLERGIES:  Allergies[1]    Blood pressure 138/83, pulse 76, weight 276 lb (125.2 kg), last menstrual period 03/18/2025, not currently breastfeeding.    Review of Systems:  Constitutional:  Denies fatigue, night sweats, hot flashes  Eyes:  denies blurred or double vision  Cardiovascular:  denies chest pain or palpitations  Respiratory:  denies shortness of breath  Gastrointestinal:  denies heartburn, abdominal pain, diarrhea or constipation  Genitourinary:  denies dysuria, incontinence, abnormal vaginal discharge, vaginal itching  Musculoskeletal:  denies back pain.  Skin/Breast:  Denies any breast pain, lumps, or discharge.   Neurological:  denies headaches, extremity weakness or numbness.  Psychiatric: denies depression or anxiety.  Endocrine:   denies excessive thirst or urination.  Heme/Lymph:  denies history of anemia, easy bruising or bleeding.    Depression Screening (PHQ-2/PHQ-9): Over the LAST 2 WEEKS   Little interest or pleasure in doing things (over the last two weeks)?: Not at all    Feeling down, depressed, or hopeless (over the last two weeks)?: Not at all    PHQ-2 SCORE: 0           PHYSICAL EXAM:   Constitutional: well developed, well nourished  Head/Face: normocephalic  Neck/Thyroid: thyroid symmetric, no thyromegaly, no nodules, no adenopathy  Lymphatic:no abnormal supraclavicular or  axillary adenopathy is noted  Breast: normal without palpable masses, tenderness, asymmetry, nipple discharge, nipple retraction or skin changes  Respiratory:  lungs clear to auscultation bilaterally  Cardiovascular: regular rate and rhythm, no significant murmur  Abdomen:  soft, nontender, nondistended, no masses  Skin/Hair: no unusual rashes or bruises  Extremities: no edema, no cyanosis  Psychiatric:  Oriented to time, place, person and situation. Appropriate mood and affect    Pelvic Exam:  External Genitalia: normal appearance, hair distribution, and no lesions  Urethral Meatus:  normal in size, location, without lesions and prolapse  Bladder:  No fullness, masses or tenderness  Vagina:  Normal appearance without lesions, no abnormal discharge  Cervix:  Normal without tenderness on motion  Uterus: normal in size, contour, position, mobility, without tenderness  Adnexa: normal without masses or tenderness  Perineum: normal  Anus: no hemorroids     Results  RADIOLOGY  Mammogram: Normal findings       Assessment & Plan:    Encounter Diagnoses   Name Primary?    Encounter for gynecological examination without abnormal finding Yes    Breakthrough bleeding on OCPs     Visit for screening mammogram     Medication refill        Assessment & Plan  Breakthrough Bleeding  Breakthrough bleeding occurs during the first or second week of the norepinephrine birth control pill cycle.  - Order pelvic ultrasound to rule out other causes of breakthrough bleeding.  - Refill norepinephrine birth control pill.  - Consider changing birth control pill if ultrasound is normal and breakthrough bleeding persists.    Urinary Incontinence  Chronic urinary incontinence occurs with movement and at rest. Previous pessary use was unsuccessful.  - Encourage weight loss to improve incontinence symptoms.  - Advise follow-up with urogynecologist if symptoms worsen.    Dehydration  Reports leg cramps possibly due to dehydration and poor water  intake.  - Advise increasing water intake, especially before consuming coffee.    Routine Health Maintenance  Up to date with mammogram and Pap smear screenings.  - Schedule mammogram for November 23, 2025.  - Plan for Pap smear in late July 2025.     ASCCP guidelines discussed,cotest done- [id number] in 3 years,rtc 1 year for annual exam   SBE encouraged  No orders of the defined types were placed in this encounter.      Requested Prescriptions     Signed Prescriptions Disp Refills    Norethindrone (HAROON-BE) 0.35 MG Oral Tab 84 tablet 3     Sig: Take 1 tablet (0.35 mg total) by mouth daily.       [facility] DANIEL 2D+3D SCREENING BILAT (CPT=77067/52820)  US PELVIS W EV (CPT=76856/08355)                   [1]   Allergies  Allergen Reactions    Azithromycin HIVES     Other reaction(s): Hives    Dog Epithelium OTHER (SEE COMMENTS)     Dogs and cats     Pollen      Other reaction(s): sneezing

## 2025-03-19 NOTE — PROGRESS NOTES
The following individual(s) verbally consented to be recorded using ambient AI listening technology and understand that they can each withdraw their consent to this listening technology at any point by asking the clinician to turn off or pause the recording:    Patient name: Guerline Chandler Jaylon

## 2025-04-07 ENCOUNTER — OFFICE VISIT (OUTPATIENT)
Dept: INTERNAL MEDICINE CLINIC | Facility: CLINIC | Age: 50
End: 2025-04-07

## 2025-04-07 VITALS
DIASTOLIC BLOOD PRESSURE: 90 MMHG | HEART RATE: 85 BPM | SYSTOLIC BLOOD PRESSURE: 150 MMHG | WEIGHT: 278.19 LBS | HEIGHT: 60.7 IN | BODY MASS INDEX: 53.2 KG/M2 | OXYGEN SATURATION: 98 %

## 2025-04-07 DIAGNOSIS — Z12.11 COLON CANCER SCREENING: ICD-10-CM

## 2025-04-07 DIAGNOSIS — I10 ESSENTIAL HYPERTENSION WITH GOAL BLOOD PRESSURE LESS THAN 140/90: ICD-10-CM

## 2025-04-07 DIAGNOSIS — Z00.00 PHYSICAL EXAM, ANNUAL: Primary | ICD-10-CM

## 2025-04-07 DIAGNOSIS — E55.9 VITAMIN D DEFICIENCY: ICD-10-CM

## 2025-04-07 DIAGNOSIS — Z23 NEED FOR VACCINATION: ICD-10-CM

## 2025-04-07 RX ORDER — HYDROCHLOROTHIAZIDE 25 MG/1
25 TABLET ORAL DAILY
Qty: 90 TABLET | Refills: 0 | Status: SHIPPED | OUTPATIENT
Start: 2025-04-07

## 2025-04-07 NOTE — PATIENT INSTRUCTIONS
Eating for your heart doesn’t have to be hard or boring. You just need to know how to make healthier choices. The DASH eating plan has been developed to help you do just that. DASH stands for Dietary Approaches to Stop Hypertension. It is a plan that has been proven to be healthier for your heart and to lower your risk for high blood pressure. It can also help lower your risk for cancer, heart disease, osteoporosis, and diabetes.  Choosing from each food group  Choose foods from each of the food groups below each day. Try to get the recommended number of servings for each food group. The serving numbers are based on a diet of 2,000 calories a day. Talk to your doctor if you’re unsure about your calorie needs. Along with getting the correct servings, the DASH plan also recommends a sodium intake less than 2,300 mg per day.        Grains  Servings: 6 to 8 a day  A serving is:  1 slice bread  1 ounce dry cereal  Half a cup cooked rice, pasta or cereal  Best choices: Whole grains and any grains high in fiber. Vegetables  Servings: 4 to 5 a day  A serving is:  1 cup raw leafy vegetable  Half a cup cut-up raw or cooked vegetable  Half a cup vegetable juice  Best choices: Fresh or frozen vegetables prepared without added salt or fat.   Fruits  Servings: 4 to 5 a day  A serving is:  1 medium fruit  One-quarter cup dried fruit  Half a cup fresh, frozen, or canned fruit  Half a cup of 100% fruit juices  Best choices: A variety of fresh fruits of different colors. Whole fruits are a better choice than fruit juices. Low-fat or fat-free dairy  Servings: 2 to 3 a day  A serving is:  1 cup milk  1 cup yogurt  One and a half ounces cheese  Best choices: Skim or 1% milk, low-fat or fat-free yogurt or buttermilk, and low-fat cheeses.         Lean meats, poultry, fish  Servings: 6 or fewer a day  A serving is:  1 ounce cooked meats, poultry, or fish  1 egg  Best choices: Lean poultry and fish. Trim away visible fat. Broil, grill,  roast, or boil instead of frying. Remove skin from poultry before eating. Limit how much red meat you eat.  Nuts, seeds, beans  Servings: 4 to 5 a week  A serving is:  One-third cup nuts (one and a half ounces)  2 tablespoons nut butter or seeds  Half a cup cooked dry beans or legumes  Best choices: Dry roasted nuts with no salt added, lentils, kidney beans, garbanzo beans, and whole jansen beans.   Fats and oils  Servings: 2 to 3 a day  A serving is:  1 teaspoon vegetable oil  1 teaspoon soft margarine  1 tablespoon mayonnaise  2 tablespoons salad dressing  Best choices: Nut and vegetable oils (nontropical vegetable oils), such as olive and canola oil. Sweets  Servings: 5 a week or fewer  A serving is:  1 tablespoon sugar, maple syrup, or honey  1 tablespoon jam or jelly  1 half-ounce jelly beans (about 15)  1 cup lemonade  Best choices: Dried fruit can be a satisfying sweet. Choose low-fat sweets. And watch your serving sizes!      For more on the DASH eating plan, visit:  www.nhlbi.nih.gov/health/health-topics/topics/dash   Date Last Reviewed: 6/1/2016 © 2000-2018 [employer]. [address] [place] [address]. All rights reserved. This information is not intended as a substitute for professional medical care. Always follow your healthcare professional's instructions. Low-Salt Choices  Eating salt (sodium) can make your body retain too much water. Excess water makes your heart work harder. Canned, packaged, and frozen foods are easy to prepare. But they are often high in sodium. Here are some ideas for low-salt foods you can easily make yourself.  For breakfast  Fruit or 100% fruit juice  Whole-wheat bread or an English muffin. Look for sodium content on Nutrition Facts labels.  Low-fat milk or yogurt  Unsalted eggs  Shredded wheat  Corn tortillas  Unsalted steamed rice  Regular (not instant) hot cereal, made without salt  Stay away from:  Sausage, sandra, and ham  Flour tortillas  Packaged  muffins, pancakes, and biscuits  Instant hot cereals  Cottage cheese  For lunch and dinner  Fresh fish, chicken, turkey, or meat--baked, broiled, or roasted without salt  Dry beans, cooked without salt  Tofu, stir-fried without salt  Unsalted fresh fruit and vegetables, or frozen or canned fruit and vegetables with no added salt  Stay away from:  Lunch or deli meat that is cured or smoked  Cheese  Tomato juice and ketchup  Canned vegetables, soups, and fish not labeled as no-salt-added or reduced sodium  Packaged gravies and sauces  Olives, pickles, and relish  Bottled salad dressings  For snacks and desserts  Yogurt  Unsalted, air-popped popcorn  Unsalted nuts or seeds  Stay away from:  Pies and cakes  Packaged dessert mixes  Pizza  Canned and packaged puddings  Pretzels, chips, crackers, and nuts--unless the label says unsalted  Date Last Reviewed: 6/1/2017  © 2548-5802 [employer] [address] [place] [address], [place] [address]. All rights reserved. This information is not intended as a substitute for professional medical care. Always follow your healthcare professional's instructions.

## 2025-04-07 NOTE — PROGRESS NOTES
Guerline Iyer is a 49 year old female.  Chief Complaint   Patient presents with    Physical       HPI:   Pt comes for her annual physical  C/c annual physical  C/o went to [place] with her daughter and her school   Father passed away and mom is staying with her since June and planning on taking turns with other 5 siblings           HISTORY  11/2023  Since last visit hurt her left knee and it got better with ibuprofen  Has been feeling like she's getting sick more frequently - June then in aug  Now that the knee is better she would like to exercise      HISTORY  1/2022  New  Pt   C/o establish care   C/o annual physical  Needs refills , ran out of her amlodipine today so did not take her medications         PMH  htn  All rhinitis -sees dr mookie gooden def   H/o panic attacks and anxiety     Lives with  and 3 kids and dog    Works - [employer]     Current Outpatient Medications   Medication Sig Dispense Refill    hydroCHLOROthiazide 25 MG Oral Tab Take 1 tablet (25 mg total) by mouth daily. 90 tablet 0    Norethindrone (HAROON-BE) 0.35 MG Oral Tab Take 1 tablet (0.35 mg total) by mouth daily. 84 tablet 3    amLODIPine 5 MG Oral Tab Take 1 tablet (5 mg total) by mouth 2 (two) times daily. 180 tablet 1    montelukast 10 MG Oral Tab Take 1 tablet (10 mg total) by mouth nightly. 90 tablet 2    levocetirizine 5 MG Oral Tab Take 1 tablet (5 mg total) by mouth nightly. 90 tablet 2    albuterol (PROAIR HFA) 108 (90 Base) MCG/ACT Inhalation Aero Soln Inhale 2 puffs into the lungs every 6 (six) hours as needed for Wheezing. 1 each 0    Cholecalciferol (VITAMIN D3) 25 MCG (1000 UT) Oral Cap Take 1 tablet by mouth daily.        Past Medical History:    De Quervain's syndrome (tenosynovitis)    right hand - release, 2014    Depression    Environmental allergies    Essential hypertension    Obesity, unspecified    TROY (obstructive sleep apnea)    AHI 5 Sao2 Sanjay 86%    Panic anxiety syndrome    Pregnancy (HCC)    Varicose  veins      Past Surgical History:   Procedure Laterality Date      (9 hr. labor); (8 hr. labor);     Surgery - external Right     Wrist Tendon      Social History:  Social History     Socioeconomic History    Marital status:    Tobacco Use    Smoking status: Never     Passive exposure: Never    Smokeless tobacco: Never    Tobacco comments:     No Household Smokers, Parents smoked during her childhood.    Vaping Use    Vaping status: Never Used   Substance and Sexual Activity    Alcohol use: Yes     Alcohol/week: 0.0 standard drinks of alcohol     Comment: wine, socially    Drug use: No     Comment: NONE    Sexual activity: Yes     Birth control/protection: OCP   Other Topics Concern    Caffeine Concern Yes     Comment: coffee/tea - 1cup/day     Social Drivers of Health     Food Insecurity: No Food Insecurity (3/19/2025)    NCSS - Food Insecurity     Worried About Running Out of Food in the Last Year: No     Ran Out of Food in the Last Year: No   Transportation Needs: No Transportation Needs (3/19/2025)    NCSS - Transportation     Lack of Transportation: No   Housing Stability: Not At Risk (3/19/2025)    NCSS - Housing/Utilities     Has Housing: Yes     Worried About Losing Housing: No     Unable to Get Utilities: No        REVIEW OF SYSTEMS:   GENERAL HEALTH: No fevers, chills, sweats, fatigue  VISION: No recent vision problems, blurry vision or double vision  HEENT: No decreased hearing ear pain nasal congestion or sore throat  SKIN: denies any unusual skin lesions or rashes  RESPIRATORY: denies shortness of breath, cough, wheezing  CARDIOVASCULAR: denies chest pain on exertion, palpitations, + swelling in feet L>R   GI: denies abdominal pain and + heartburn, no nausea or vomiting  : No Pain on urination, change in the color of urine, discharge, urinating frequently  MUS: + occ  back pain and joint pain- hip , no muscle pain  NEURO: denies headaches , anxiety, depression    EXAM:   BP  150/90   Pulse 85   Ht 5' 0.7\" (1.542 m)   Wt 278 lb 3.2 oz (126.2 kg)   LMP 03/18/2025 (Approximate)   SpO2 98%   BMI 53.09 kg/m²   GENERAL: well developed, well nourished,in no apparent distress  SKIN: no rashes,no suspicious lesions  HEENT: atraumatic, normocephalic,ears and throat are clear, no frontal  Sinus tenderness, pupils equal reactive to light bilaterally, extraocular muscles intact   NECK: supple,no adenopathy, nontender   LUNGS: clear to auscultation, no wheeze  CARDIO: RRR without murmur  GI: good BS's,no masses or tenderness  EXTREMITIES: no cyanosis, or edema    ASSESSMENT AND PLAN:   Diagnoses and all orders for this visit:    Physical exam, annual  -     Comp Metabolic Panel (14); Future  -     Lipid Panel; Future  -     TSH W Reflex To Free T4; Future  -     Vitamin D; Future  -     CBC, Platelet; No Differential; Future    Need for vaccination  -     TETANUS, DIPHTHERIA TOXOIDS AND ACELLULAR PERTUSIS VACCINE (TDAP), >7 YEARS, IM USE    Colon cancer screening  -     [facility] GI Telephone Colon Screen  -     GASTRO - INTERNAL    Vitamin D deficiency  -     Vitamin D; Future    Essential hypertension with goal blood pressure less than 140/90  -     hydroCHLOROthiazide 25 MG Oral Tab; Take 1 tablet (25 mg total) by mouth daily.      Advised patient to watch  what she eats and exercise, seatbelt use no texting driving, sunscreen use advised  Patient stated that she would think about going to the weight management clinic--she will call back  Advised pt to follow a low salt , low sodium (including fast foods and processed foods), can look up DASH diet, exercise 30 min a day , monitor bp , she will call back with BP readings   Cont amlodipine and start hydrochlorothiazide   RTC [facility] [place]       Preventative medicine  Pap-  pap   cscope - will refer   Mammo 11/2024   covid vaccine utd  Pap Steffi das page - 2018 and 8/2022  -   Labs 1/2023 and 2/2024  reviewed       The patient indicates understanding of these  issues and agrees to the plan.  No follow-ups on file.

## 2025-04-10 ENCOUNTER — TELEPHONE (OUTPATIENT)
Dept: INTERNAL MEDICINE CLINIC | Facility: CLINIC | Age: 50
End: 2025-04-10

## 2025-04-11 DIAGNOSIS — Z76.0 MEDICATION REFILL: ICD-10-CM

## 2025-04-12 RX ORDER — ACETAMINOPHEN AND CODEINE PHOSPHATE 120; 12 MG/5ML; MG/5ML
0.35 SOLUTION ORAL DAILY
Qty: 28 TABLET | Refills: 0 | OUTPATIENT
Start: 2025-04-12

## 2025-04-12 NOTE — TELEPHONE ENCOUNTER
Last annual - 3/19/2025  Last pap - 7/27/2022, negative  Last mammogram - 11/23/2024, negative      Prescription for Aster-Be sent on 3/19/2025 for #84 with 3 refills.  Request denied.  Patient should have refills.

## 2025-04-14 NOTE — PROGRESS NOTES
Called patient for scheduled telephone colon screen.   Please advise on colonoscopy and bowel prep orders.   Medications, pharmacy, and allergies reviewed.     Age 45-74 y/o:   › MD preference: [facility]  › Insurance:  BCBS PPO  › Last PCP visit: Dr Irais Roth 04/07/2025  › Last CBC: 04/07/2025  › H/W/BMI: 5'/278/53.09    Pt's blood pressure was elevated at her office visit with Dr Roth. She will be checking her B/P at home daily. Dr Rtoh started her on hydrochlorothiazide. She will be returning to the office for a blood pressure check in 3 months. Given diet and exercise guidelines.    Telephone Colon Screening Questionnaire Yes No   Are you currently experiencing any GI symptoms [] [x]   If yes, explain:     Rectal bleeding [] [x]   Black stool [] [x]   Dysphagia or food \"feeling stuck\" when eating [] [x]   Intractable vomiting [] [x]   Unexplained weight loss [] [x]   First colonoscopy [x] []   Family history of colon cancer [] [x]   Any issues with anesthesia [] [x]   If yes, explain:      Any recent complaints related to chest pain &/or shortness of breath [] [x]   Referred to a cardiologist?  [] [x]   If yes, explain:      History of  respiratory issues/oxygen/TROY/COPD [x] []   CPAP/BiPAP: was given CPAP,she doesn't use it     History of devices (pacemaker/defibrillator) doesn't use [] [x]   History of heart attack &/or stroke [] [x]   If yes, in the last 12 months? Stent placement?  [] []     Medication Reconciliation  Yes  No   Anticoagulants (except Aspirin) [] [x]   Diabetic Medication [] [x]   Weight loss medication (phentermine/vyvanse/saxsenda/etc) [] [x]   Iron/herbal/multivitamin supplement(s) Vitamin D [] []   Usage of marijuana, CBD &/or vape product(s) [] [x]

## 2025-04-15 ENCOUNTER — NURSE ONLY (OUTPATIENT)
Facility: CLINIC | Age: 50
End: 2025-04-15

## 2025-04-21 RX ORDER — SODIUM, POTASSIUM,MAG SULFATES 17.5-3.13G
SOLUTION, RECONSTITUTED, ORAL ORAL
Qty: 1 EACH | Refills: 0 | Status: SHIPPED | OUTPATIENT
Start: 2025-04-21

## 2025-04-22 NOTE — PROGRESS NOTES
Thanks Trent !!!    Recent PCP OV Dr. Irais Roth 4/7/2025 reviewed.  Healthy woman.  Recent concern for elevated blood pressure.    GI schedulers -    Please schedule colonoscopy exam at [facility] ([facility]    BMI Readings from Last 1 Encounters:   04/07/25 53.09 kg/m²     MAC anesthesia     BP Readings from Last 5 Encounters:   04/07/25 150/90   03/19/25 138/83   12/06/23 120/77   11/14/23 139/82   09/27/23 138/72       Suprep small volume bowel prep if covered by insurance, otherwise   Golytely (PEG) 4L bowel prep  Rx sent in to [facility]      DX = Screening colonoscopy examination    Medication instructions:    None

## 2025-05-01 NOTE — PROGRESS NOTES
*2nd attempt*     Left message to call back-    If patient call back please forward to GI schedulers.    Thank you!

## 2025-05-06 NOTE — PROGRESS NOTES
Left message to call back-     If patient call back please forward to GI schedulers.    This is the third attempt to reach this pt in regards to scheduling with NO success.    Letter sent.    TE closed.

## 2025-05-12 ENCOUNTER — NURSE TRIAGE (OUTPATIENT)
Dept: INTERNAL MEDICINE CLINIC | Facility: CLINIC | Age: 50
End: 2025-05-12

## 2025-05-12 DIAGNOSIS — M25.561 CHRONIC PAIN OF RIGHT KNEE: Primary | ICD-10-CM

## 2025-05-12 DIAGNOSIS — G89.29 CHRONIC PAIN OF RIGHT KNEE: Primary | ICD-10-CM

## 2025-05-12 NOTE — TELEPHONE ENCOUNTER
Action Requested: Summary for Provider     []  Critical Lab, Recommendations Needed  [x] Need Additional Advice  []   FYI    []   Need Orders  [] Need Medications Sent to Pharmacy  []  Other     SUMMARY:  patient stated that she has a PPO insurance and she wanted to know what orthopedic do you recommend she see  for her right knee pain that she has been having for a  couple weeks now. The pain is a 8/10 on/off she also feels a crackling sensation on the back of her knee. She has taken ibuprofen, icing her knee and is using a knee brace. The Ibuprofen helps but still feels the pain. She does not see redness but she does feel like there is inflammation. Patient does not recall injuring it.   what orthopedic do you recommend?    Reason for call: Knee pain  Onset: A few weeks        Reason for Disposition   MODERATE pain (e.g., symptoms interfere with work or school, limping) and present > 3 days    Protocols used: Knee Pain-A-OH

## 2025-05-13 NOTE — TELEPHONE ENCOUNTER
Attempted to call patient- Left message for patient to return call. My chart message sent. Will postpone for follow up.

## 2025-05-15 NOTE — TELEPHONE ENCOUNTER
Patient has viewed note on [employer] as per date/time stamp copied here:  Knee Pain  Message [id number]  From  Renetta Henderson RN To  Guerline Iyer Sent and Delivered  5/13/2025 10:32 AM   Last Read in [employer]  5/13/2025 12:04 PM by Guerline Iyer

## 2025-05-23 NOTE — TELEPHONE ENCOUNTER
Disp Refills Start End    Norethindrone (ASTER-BE) 0.35 MG Oral Tab 84 tablet 3 12/11/2023 12/10/2024    Sig - Route: Take 1 tablet (0.35 mg total) by mouth daily. - Oral    Sent to pharmacy as: Norethindrone 0.35 MG Oral Tablet (Aster-BE)    E-Prescribing Status: Receipt confirmed by pharmacy (12/11/2023  1:26 PM CST)      Pharmacy    [facility] #[id number] [address] AT [facility] [address] [place], [phone], [phone]     Refill not appropriate. Prescription denied.   
 used

## 2025-06-03 ENCOUNTER — TELEPHONE (OUTPATIENT)
Dept: ORTHOPEDICS CLINIC | Facility: CLINIC | Age: 50
End: 2025-06-03

## 2025-06-03 DIAGNOSIS — M25.561 RIGHT KNEE PAIN, UNSPECIFIED CHRONICITY: Primary | ICD-10-CM

## 2025-06-03 DIAGNOSIS — Z01.89 ENCOUNTER FOR LOWER EXTREMITY COMPARISON IMAGING STUDY: ICD-10-CM

## 2025-06-05 ENCOUNTER — OFFICE VISIT (OUTPATIENT)
Age: 50
End: 2025-06-05
Payer: COMMERCIAL

## 2025-06-05 ENCOUNTER — HOSPITAL ENCOUNTER (OUTPATIENT)
Dept: MRI IMAGING | Facility: HOSPITAL | Age: 50
Discharge: HOME | End: 2025-06-05
Attending: STUDENT IN AN ORGANIZED HEALTH CARE EDUCATION/TRAINING PROGRAM
Payer: COMMERCIAL

## 2025-06-05 ENCOUNTER — HOSPITAL ENCOUNTER (OUTPATIENT)
Dept: GENERAL RADIOLOGY | Facility: HOSPITAL | Age: 50
Discharge: HOME | End: 2025-06-05
Attending: STUDENT IN AN ORGANIZED HEALTH CARE EDUCATION/TRAINING PROGRAM
Payer: COMMERCIAL

## 2025-06-05 DIAGNOSIS — S83.211A BUCKET HANDLE TEAR OF MEDIAL MENISCUS OF RIGHT KNEE, INITIAL ENCOUNTER: ICD-10-CM

## 2025-06-05 DIAGNOSIS — S83.211A BUCKET HANDLE TEAR OF MEDIAL MENISCUS OF RIGHT KNEE, INITIAL ENCOUNTER: Primary | ICD-10-CM

## 2025-06-05 DIAGNOSIS — M25.561 RIGHT KNEE PAIN, UNSPECIFIED CHRONICITY: ICD-10-CM

## 2025-06-05 DIAGNOSIS — Z01.89 ENCOUNTER FOR LOWER EXTREMITY COMPARISON IMAGING STUDY: ICD-10-CM

## 2025-06-05 PROCEDURE — 99214 OFFICE O/P EST MOD 30 MIN: CPT | Performed by: STUDENT IN AN ORGANIZED HEALTH CARE EDUCATION/TRAINING PROGRAM

## 2025-06-05 PROCEDURE — 73564 X-RAY EXAM KNEE 4 OR MORE: CPT | Performed by: STUDENT IN AN ORGANIZED HEALTH CARE EDUCATION/TRAINING PROGRAM

## 2025-06-05 PROCEDURE — 73721 MRI JNT OF LWR EXTRE W/O DYE: CPT | Performed by: STUDENT IN AN ORGANIZED HEALTH CARE EDUCATION/TRAINING PROGRAM

## 2025-06-10 NOTE — PROGRESS NOTES
[facility]  [address] [facility] [address]  [phone]     NURSING INTAKE COMMENTS:   Chief Complaint   Patient presents with    Knee Pain     Right knee - Pain onset since end of April. Pain when sitting and achy when sitting down. Snapping when bending. Pain mainly to medial aspect.             The following individual(s) verbally consented to be recorded using ambient AI listening technology and understand that they can each withdraw their consent to this listening technology at any point by asking the clinician to turn off or pause the recording:    Patient name: Guerline Iyer      HPI:   History of Present Illness  Guerline Iyer is a 49 year old female who presents with right knee pain.    She has been experiencing right knee pain since the end of April, approximately one month ago. Initially, she managed the pain with ibuprofen, which provided some relief, but the pain persists and is not improving as she hoped.    The pain is described as a snapping sensation, 'like a pencil snapping in pain,' particularly when bending her knee. This has led to a fear of bending her knee due to the pain and a sensation of the knee catching or locking at times. Swelling in the knee is also reported occasionally.    The pain is primarily located on the inside of the knee, but she also feels it on the outside. Massaging the area provides some relief. The pain worsens with certain movements, such as bending the knee or using stairs, and is intense when performing a lunge with her right leg.    She has been using a knee brace, which she finds helpful for stabilization. No other medications or treatments aside from ibuprofen and the knee brace have been mentioned.    No pain with specific movements, such as a specific click behind the kneecap, but increased pain with stairs and certain bending motions.          Past Medical History[1]  Past Surgical History[2]  Current Medications[3]  Allergies[4]  Family  History[5]    Social History     Occupational History    Not on file   Tobacco Use    Smoking status: Never     Passive exposure: Never    Smokeless tobacco: Never    Tobacco comments:     No Household Smokers, Parents smoked during her childhood.    Vaping Use    Vaping status: Never Used   Substance and Sexual Activity    Alcohol use: Yes     Alcohol/week: 0.0 standard drinks of alcohol     Comment: wine, socially    Drug use: No     Comment: NONE    Sexual activity: Yes     Birth control/protection: OCP        Review of Systems:  GENERAL: denies fevers, chills, night sweats, fatigue, unintentional weight loss/gain  SKIN: denies skin lesions, open sores, rash  HEENT:denies recent vision change, new nasal congestion,hearing loss, tinnitus, sore throat, headaches  RESPIRATORY: denies new shortness of breath, cough, asthma, wheezing  CARDIOVASCULAR: denies chest pain, leg cramps with exertion, palpitations, leg swelling  GI: denies abdominal pain, nausea, vomiting, diarrhea, constipation, hematochezia, worsening heartburn or stomach ulcers  : denies dysuria, hematuria, incontinence, increased frequency, urgency, difficulty urinating  MUSCULOSKELETAL: denies musculoskeletal complaints other than in HPI  NEURO: denies numbness, tingling, weakness, balance issues, dizziness, memory loss  PSYCHIATRIC: denies Hx of depression, anxiety, other psychiatric disorders  HEMATOLOGIC: denies blood clots, anemia, blood clotting disorders, blood transfusion  ENDOCRINE: denies autoimmune disease, thyroid issues, or diabetes  ALLERGY: denies asthma, seasonal allergies    Physical Examination:    LMP 03/18/2025 (Approximate)     Physical Exam  MUSCULOSKELETAL: Tenderness in the right knee, particularly on manipulation.    Constitutional: appears well hydrated, alert and responsive, no acute distress noted    Right Knee exam    Range of motion 0-125 significant pain at terminal forced flexion, medial joint line pain mild lateral  joint line tenderness stable Lachman negative anterior negative posterior neuro intact distally          Imaging:     Results  RADIOLOGY  Knee X-ray: Normal osseous structures, preserved joint space, no evidence of arthritis (06/05/2025)      Imaging was independently reviewed and interpreted by attending physician       Labs:  Lab Results   Component Value Date    WBC 8.4 02/01/2024    HGB 12.4 02/01/2024    .0 02/01/2024      Lab Results   Component Value Date    GLU 89 02/01/2024    BUN 12 02/01/2024    CREATSERUM 0.70 02/01/2024    GFRNAA 95 09/24/2020    GFRAA 110 09/24/2020        Assessment and Plan:  Assessment & Plan  Right knee pain with suspected meniscal tear  Right knee pain suggests acute meniscal tear, possibly complex or bucket handle. X-rays normal. MRI needed for confirmation and extent assessment.  - Order MRI of right knee.  - Consider cortisone injection and physical therapy for small tear.  - Discuss arthroscopic surgery for complex tear.  - Advise knee brace for stabilization.      Diagnoses and all orders for this visit:    Bucket handle tear of medial meniscus of right knee, initial encounter  -     Cancel: MRI KNEE, RIGHT (AGX=53351); Future  -     MRI KNEE, RIGHT ([id number]=[id number]); Future            Follow Up: No follow-ups on file.          Diego Carrero MD Orthopedic Surgery / Sports Medicine Specialist  [facility]  [facility] [place] [address]  [email or web address]    t: [phone] f: [phone]    This note was dictated using Dragon software.  While it was briefly proofread prior to completion, some grammatical, spelling, and word choice errors due to dictation may still occur.       [1]   Past Medical History:   De Quervain's syndrome (tenosynovitis)    right hand - release, 2014    Depression    Environmental allergies    Essential hypertension    Obesity, unspecified    TROY (obstructive sleep apnea)    AHI 5 Sao2 Sanjay 86%    Panic anxiety syndrome     Pregnancy (HCC)    Varicose veins   [2]   Past Surgical History:  Procedure Laterality Date      (9 hr. labor); (8 hr. labor);     Surgery - external Right     Wrist Tendon   [3]   Current Outpatient Medications   Medication Sig Dispense Refill    Na Sulfate-K Sulfate-Mg Sulf (SUPREP BOWEL PREP KIT) 17.5-3.13-1.6 GM/177ML Oral Solution Take as directed 1 each 0    Norethindrone (HAROON-BE) 0.35 MG Oral Tab Take 1 tablet (0.35 mg total) by mouth daily. 84 tablet 3    amLODIPine 5 MG Oral Tab Take 1 tablet (5 mg total) by mouth 2 (two) times daily. 180 tablet 1    montelukast 10 MG Oral Tab Take 1 tablet (10 mg total) by mouth nightly. 90 tablet 2    levocetirizine 5 MG Oral Tab Take 1 tablet (5 mg total) by mouth nightly. 90 tablet 2    albuterol (PROAIR HFA) 108 (90 Base) MCG/ACT Inhalation Aero Soln Inhale 2 puffs into the lungs every 6 (six) hours as needed for Wheezing. 1 each 0    Cholecalciferol (VITAMIN D3) 25 MCG (1000 UT) Oral Cap Take 1 tablet by mouth daily.      hydroCHLOROthiazide 25 MG Oral Tab Take 1 tablet (25 mg total) by mouth daily. 90 tablet 0   [4]   Allergies  Allergen Reactions    Azithromycin HIVES     Other reaction(s): Hives    Dog Epithelium OTHER (SEE COMMENTS)     Dogs and cats     Pollen      Other reaction(s): sneezing   [5]   Family History  Problem Relation Age of Onset    Other (Other) Mother         overweight    Lipids Father     Heart Disease Father         premature CAD - stent    Heart Disorder Father 57        coronary angioplasty    Carotid stenosis Father         endarterectomy    Hypertension Sister     Obesity Sister     Other (layton) Sister     Asthma Sister     Hypertension Sister     Hypertension Brother     Hypertension Brother     Other (Other) Brother         gout    Breast Cancer Maternal Grandmother 80    Diabetes Maternal Grandfather     Heart Disease Maternal Grandfather         CAD    Cancer Paternal Grandmother         splenic    Depression Maternal  Uncle     Ovarian Cancer Neg     Pancreatic Cancer Neg     Prostate Cancer Neg

## 2025-06-26 RX ORDER — MONTELUKAST SODIUM 10 MG/1
10 TABLET ORAL NIGHTLY
Qty: 90 TABLET | Refills: 0 | Status: SHIPPED | OUTPATIENT
Start: 2025-06-26

## 2025-06-26 NOTE — TELEPHONE ENCOUNTER
Refill requested for    Name from pharmacy: MONTELUKAST 10MG TABLETS         Will file in chart as: MONTELUKAST 10 MG Oral Tab    Sig: TAKE 1 TABLET(10 MG) BY MOUTH EVERY NIGHT    Disp: 90 tablet    Refills: 2 (Pharmacy requested: Not specified)    Start: 6/26/2025    Class: Normal    Non-formulary    Last ordered: 11 months ago (7/10/2024) by Celestine Lilly MD    Last refill: 3/28/2025    Rx #: [id number]    Corticosteroids / Long-Acting Bronchodilators Failed 06/26/2025 07:33 AM   Protocol Details Appt in past 6 mos or next 3 mos    Medication is active on med list      To be filled at: [facility] #[id number] - [place], [place] - [address] AT [facility] [address] [place], [phone], [phone]          Last office visit: 07/10/2024    Previously advised to follow up in asthma on a daily controller     F/U currently scheduled?  Yes, tele med visit on 7/14/2025     ACTION: Refilled per protocol.

## 2025-06-27 RX ORDER — LEVOCETIRIZINE DIHYDROCHLORIDE 5 MG/1
5 TABLET, FILM COATED ORAL NIGHTLY
Qty: 90 TABLET | Refills: 0 | Status: SHIPPED | OUTPATIENT
Start: 2025-06-27

## 2025-06-27 NOTE — TELEPHONE ENCOUNTER
Patient last seen in Allergy 7/10/2024 for . . .    Mild persistent extrinsic asthma without complication (HCC) Yes     Seasonal and perennial allergic rhinoconjunctivitis      Flu vaccine need      Need for COVID-19 vaccine      Need for prophylactic vaccination with Streptococcus pneumoniae (Pneumococcus) and Influenza vaccines        Requested Prescriptions   Pending Prescriptions Disp Refills    levocetirizine 5 MG Oral Tab 90 tablet 2     Sig: Take 1 tablet (5 mg total) by mouth nightly.       Antihistamines Passed - 6/27/2025  9:43 AM        Passed - Appt in past 12 mos or next 1 mos     Recent Outpatient Visits              2 weeks ago [facility]sufficiency [facility] of right femur with [facility]    3 weeks ago Bucket handle tear of medial meniscus of right knee, [facility]    [facility]    2 months ago     [facility]    Nurse Only    2 months ago Physical exam, annual    [facility] Irais Roth MD    Office Visit    3 months ago Encounter for gynecological examination without abnormal finding    [facility] [place], [place] - OB/GYN Claire Dahl MD    Office Visit          Future Appointments         Provider Department Appt Notes    In 2 weeks Celestine Lilly MD [facility]     In 2 weeks Diego Carrero MD [facility] R knee check    In 9 months Irais Roth MD [facility] PX last 4/7/25                    Passed - Medication is active on med list           levocetirizine 5 MG Oral Tab 90 tablet 0 6/27/2025 --    Sig - Route: Take 1 tablet (5  mg total) by mouth nightly. - Oral    Sent to pharmacy as: Levocetirizine Dihydrochloride 5 MG Oral Tablet (Xyzal)    E-Prescribing Status: Receipt confirmed by pharmacy (6/27/2025  9:45 AM CDT)      Pharmacy    [id number] - [place], [place] - [address] [facility]  [address], [phone], [phone]     Prescription as above sent to pharmacy per protocol.

## 2025-07-14 ENCOUNTER — TELEMEDICINE (OUTPATIENT)
Dept: ALLERGY | Facility: CLINIC | Age: 50
End: 2025-07-14
Payer: COMMERCIAL

## 2025-07-14 DIAGNOSIS — Z23 NEED FOR PROPHYLACTIC VACCINATION WITH STREPTOCOCCUS PNEUMONIAE (PNEUMOCOCCUS) AND INFLUENZA VACCINES: ICD-10-CM

## 2025-07-14 DIAGNOSIS — Z23 FLU VACCINE NEED: ICD-10-CM

## 2025-07-14 DIAGNOSIS — H10.10 SEASONAL AND PERENNIAL ALLERGIC RHINOCONJUNCTIVITIS: ICD-10-CM

## 2025-07-14 DIAGNOSIS — J45.30 MILD PERSISTENT EXTRINSIC ASTHMA WITHOUT COMPLICATION (HCC): Primary | ICD-10-CM

## 2025-07-14 DIAGNOSIS — Z23 NEED FOR COVID-19 VACCINE: ICD-10-CM

## 2025-07-14 DIAGNOSIS — J30.2 SEASONAL AND PERENNIAL ALLERGIC RHINOCONJUNCTIVITIS: ICD-10-CM

## 2025-07-14 DIAGNOSIS — J30.89 SEASONAL AND PERENNIAL ALLERGIC RHINOCONJUNCTIVITIS: ICD-10-CM

## 2025-07-14 RX ORDER — ALBUTEROL SULFATE 90 UG/1
2 INHALANT RESPIRATORY (INHALATION) EVERY 6 HOURS PRN
Qty: 1 EACH | Refills: 0 | Status: SHIPPED | OUTPATIENT
Start: 2025-07-14

## 2025-07-14 RX ORDER — MONTELUKAST SODIUM 10 MG/1
10 TABLET ORAL NIGHTLY
Qty: 90 TABLET | Refills: 2 | Status: SHIPPED | OUTPATIENT
Start: 2025-07-14

## 2025-07-14 RX ORDER — LEVOCETIRIZINE DIHYDROCHLORIDE 5 MG/1
5 TABLET, FILM COATED ORAL NIGHTLY
Qty: 90 TABLET | Refills: 2 | Status: SHIPPED | OUTPATIENT
Start: 2025-07-14

## 2025-07-14 NOTE — PROGRESS NOTES
Guerline Iyer is a 49 year old female.    HPI:   No chief complaint on file.    Patient is a 49-year-old female who presents for follow-up via video visit    This visit is conducted using Telemedicine with live, interactive video and audio during this Coronavirus pandemic.     Please note that the following visit was completed using two-way, real-time interactive audio and/or video communication.  This has been done in good mojgan to provide continuity of care in the best interest of the provider-patient relationship, due to the ongoing public health crisis/national emergency and because of restrictions of visitation.  There are limitations of this visit as no physical exam could be performed.  Every conscious effort was taken to allow for sufficient and adequate time.  This billing was spent on reviewing labs, medications, radiology tests and decision making.  Appropriate medical decision-making and tests are ordered as detailed in the plan of care be    Patient last seen by me on July 10, 2020 for  History of asthma allergic rhinitis and GERD  Prior skin testing in 2019 with outside allergist Dr. Fregoso in [place] was positive to ragweed mold dust mite cats and dogs  At last visit 1 dog in the home  Medication list include Xyzal Singulair albuterol      Today patient reports    History of Present Illness       Ar:  Active or persistent symptoms: denies   Active meds: xyzal singulair   Denies recurrent sinus infections or antibiotics.  Happy with control with Xyzal and Singulair      Asthma  Active or persistent symptoms: denies   ED visits or prednisone in the interim   Active meds: alb     Gerd  + active gerd   No meds   Recent knee issue with a knee contusion that has left her in a wheelchair and/or on bedrest over the past 4 weeks.        HISTORY:  Past Medical History[1]   Past Surgical History[2]   Family History[3]   Social History: Short Social Hx on File[4]     Medications (Active prior to today's  visit):  Current Medications[5]    Allergies:  Allergies[6]      ROS:   Allergic/Immuno:  See hpi  Cardiovascular:  Negative for irregular heartbeat/palpitations, chest pain, edema  Constitutional:  Negative night sweats,weight loss, irritability and lethargy  ENMT:  Negative for ear drainage, hearing loss and nasal drainage  Eyes:  Negative for eye discharge and vision loss  Gastrointestinal:  Negative for abdominal pain, diarrhea and vomiting  Integumentary:  Negative for pruritus and rash  Respiratory:  Negative for cough, dyspnea and wheezing    PHYSICAL EXAM:   Constitutional: responsive, no acute distress noted  Head/Face: NC/Atraumatic  Speaking in full sentences no increased work of breathing  A&O x 3     ASSESSMENT/PLAN:   Assessment   Encounter Diagnoses   Name Primary?    Mild persistent extrinsic asthma without complication (HCC) Yes    Seasonal and perennial allergic rhinoconjunctivitis     Flu vaccine need     Need for COVID-19 vaccine     Need for prophylactic vaccination with Streptococcus pneumoniae (Pneumococcus) and Influenza vaccines        Assessment & Plan    #1 asthma  Mild intermittent.  No ED visits or prednisone in the interim.  Albuterol renewed as prior has .  Albuterol 2 puffs every 4 to 6 weeks if having active coughing wheezing or shortness of breath    2.  Allergic rhinitis  Continue with Xyzal and Singulair.  Denies adverse events or side effects.  Reviewed avoidance measures and potential treatment option immunotherapy.    #3 GERD.  Recent GERD symptoms.  No current medications.  More recent immobility due to a knee issue that has caused her to be wheelchair-bound and then rest over the past month.  Reviewed avoiding hot foods spicy foods caffeine chocolate alcohol  Reviewed avoid eating 2 hours before bed.  If not improving with these measures then may try Prilosec or Prevacid over-the-counter for 2 weeks.    #4 flu vaccine in the fall recommended    5.  Recommend pneumonia  vaccine with Prevnar 20 once patient turns 50 of next month.    #6 consider COVID-vaccine booster this fall         Orders This Visit:  No orders of the defined types were placed in this encounter.      Meds This Visit:  Requested Prescriptions     Signed Prescriptions Disp Refills    montelukast 10 MG Oral Tab 90 tablet 2     Sig: Take 1 tablet (10 mg total) by mouth nightly.    levocetirizine 5 MG Oral Tab 90 tablet 2     Sig: Take 1 tablet (5 mg total) by mouth nightly.    albuterol (PROAIR HFA) 108 (90 Base) MCG/ACT Inhalation Aero Soln 1 each 0     Sig: Inhale 2 puffs into the lungs every 6 (six) hours as needed for Wheezing.       Imaging & Referrals:  None     2025  Celestine Lilly MD    If medication samples were provided today, they were provided solely for patient education and training related to self administration of these medications.  Teaching, instruction and sample was provided to the patient by myself.  Teaching included  a review of potential adverse side effects as well as potential efficacy.  Patient's questions were answered in regards to medication administration and dosing and potential side effects. Teaching was provided via the teach back method           [1]   Past Medical History:   De Quervain's syndrome (tenosynovitis)    right hand - release,     Depression    Environmental allergies    Essential hypertension    Obesity, unspecified    TROY (obstructive sleep apnea)    AHI 5 Sao2 Sanjay 86%    Panic anxiety syndrome    Pregnancy (HCC)    Varicose veins   [2]   Past Surgical History:  Procedure Laterality Date      (9 hr. labor); (8 hr. labor);     Surgery - external Right     Wrist Tendon   [3]   Family History  Problem Relation Age of Onset    Other (Other) Mother         overweight    Lipids Father     Heart Disease Father         premature CAD - stent    Heart Disorder Father 57        coronary angioplasty    Carotid stenosis Father         endarterectomy     Hypertension Sister     Obesity Sister     Other (layton) Sister     Asthma Sister     Hypertension Sister     Hypertension Brother     Hypertension Brother     Other (Other) Brother         gout    Breast Cancer Maternal Grandmother 80    Diabetes Maternal Grandfather     Heart Disease Maternal Grandfather         CAD    Cancer Paternal Grandmother         splenic    Depression Maternal Uncle     Ovarian Cancer Neg     Pancreatic Cancer Neg     Prostate Cancer Neg    [4]   Social History  Socioeconomic History    Marital status:    Tobacco Use    Smoking status: Never     Passive exposure: Never    Smokeless tobacco: Never    Tobacco comments:     No Household Smokers, Parents smoked during her childhood.    Vaping Use    Vaping status: Never Used   Substance and Sexual Activity    Alcohol use: Yes     Alcohol/week: 0.0 standard drinks of alcohol     Comment: wine, socially    Drug use: No     Comment: NONE    Sexual activity: Yes     Birth control/protection: OCP   Other Topics Concern    Caffeine Concern Yes     Comment: coffee/tea - 1cup/day     Social Drivers of Health     Food Insecurity: No Food Insecurity (3/19/2025)    NCSS - Food Insecurity     Worried About Running Out of Food in the Last Year: No     Ran Out of Food in the Last Year: No   Transportation Needs: No Transportation Needs (3/19/2025)    NCSS - Transportation     Lack of Transportation: No   Housing Stability: Not At Risk (3/19/2025)    NCSS - Housing/Utilities     Has Housing: Yes     Worried About Losing Housing: No     Unable to Get Utilities: No   [5]   Current Outpatient Medications   Medication Sig Dispense Refill    montelukast 10 MG Oral Tab Take 1 tablet (10 mg total) by mouth nightly. 90 tablet 2    levocetirizine 5 MG Oral Tab Take 1 tablet (5 mg total) by mouth nightly. 90 tablet 2    albuterol (PROAIR HFA) 108 (90 Base) MCG/ACT Inhalation Aero Soln Inhale 2 puffs into the lungs every 6 (six) hours as needed for Wheezing. 1  each 0    traMADol 50 MG Oral Tab Take 1 tablet (50 mg total) by mouth every 12 (twelve) hours as needed for Pain. 30 tablet 0    Na Sulfate-K Sulfate-Mg Sulf (SUPREP BOWEL PREP KIT) 17.5-3.13-1.6 GM/177ML Oral Solution Take as directed 1 each 0    hydroCHLOROthiazide 25 MG Oral Tab Take 1 tablet (25 mg total) by mouth daily. 90 tablet 0    Norethindrone (HAROON-BE) 0.35 MG Oral Tab Take 1 tablet (0.35 mg total) by mouth daily. 84 tablet 3    amLODIPine 5 MG Oral Tab Take 1 tablet (5 mg total) by mouth 2 (two) times daily. 180 tablet 1    Cholecalciferol (VITAMIN D3) 25 MCG (1000 UT) Oral Cap Take 1 tablet by mouth daily.     [6]   Allergies  Allergen Reactions    Azithromycin HIVES     Other reaction(s): Hives    Dog Epithelium OTHER (SEE COMMENTS)     Dogs and cats     Pollen      Other reaction(s): sneezing

## 2025-07-14 NOTE — PATIENT INSTRUCTIONS
#1 asthma  Mild intermittent.  No ED visits or prednisone in the interim.  Albuterol renewed as prior has .  Albuterol 2 puffs every 4 to 6 weeks if having active coughing wheezing or shortness of breath    2.  Allergic rhinitis  Continue with Xyzal and Singulair.  Denies adverse events or side effects.  Reviewed avoidance measures and potential treatment option immunotherapy.    #3 GERD.  Recent GERD symptoms.  No current medications.  More recent immobility due to a knee issue that has caused her to be wheelchair-bound and then rest over the past month.  Reviewed avoiding hot foods spicy foods caffeine chocolate alcohol  Reviewed avoid eating 2 hours before bed.  If not improving with these measures then may try Prilosec or Prevacid over-the-counter for 2 weeks.    #4 flu vaccine in the fall recommended    5.  Recommend pneumonia vaccine with Prevnar 20 once patient turns 50 of next month.    #6 consider COVID-vaccine booster this fall         Orders This Visit:  No orders of the defined types were placed in this encounter.      Meds This Visit:  Requested Prescriptions     Signed Prescriptions Disp Refills    montelukast 10 MG Oral Tab 90 tablet 2     Sig: Take 1 tablet (10 mg total) by mouth nightly.    levocetirizine 5 MG Oral Tab 90 tablet 2     Sig: Take 1 tablet (5 mg total) by mouth nightly.    albuterol (PROAIR HFA) 108 (90 Base) MCG/ACT Inhalation Aero Soln 1 each 0     Sig: Inhale 2 puffs into the lungs every 6 (six) hours as needed for Wheezing.

## 2025-07-16 ENCOUNTER — OFFICE VISIT (OUTPATIENT)
Age: 50
End: 2025-07-16
Payer: COMMERCIAL

## 2025-07-16 DIAGNOSIS — M84.451D INSUFFICIENCY FRACTURE OF RIGHT FEMUR WITH ROUTINE HEALING, SUBSEQUENT ENCOUNTER: Primary | ICD-10-CM

## 2025-07-16 PROCEDURE — 99213 OFFICE O/P EST LOW 20 MIN: CPT | Performed by: STUDENT IN AN ORGANIZED HEALTH CARE EDUCATION/TRAINING PROGRAM

## 2025-07-22 NOTE — PROGRESS NOTES
[place] - ORTHOPEDICS  [address] [facility] [address]  [phone]     NURSING INTAKE COMMENTS:   Chief Complaint   Patient presents with    Knee Pain     Right f/u - states she is good - she put some pressure on it but she did not walk on it - still has some sharp pain rated as 2/10 on and off             The following individual(s) verbally consented to be recorded using ambient AI listening technology and understand that they can each withdraw their consent to this listening technology at any point by asking the clinician to turn off or pause the recording:    Patient name: Guerline Iyer        HPI:   History of Present Illness  Guerline Iyer is a 49 year old female who presents for follow-up of diffuse edema and insufficiency fractures. She is accompanied by her .    She has been experiencing diffuse edema and insufficiency fractures for five weeks, with significant improvement noted. She estimates an 80% improvement but continues to have a 'little twinge of pain' when walking, rated as four or five out of ten. She has been using a wheelchair and has not been walking on the affected area.    She has not taken any steps on her own without crutches due to fear of pain. She reports that her baseline pain is no longer present, but she experiences pain with full weight bearing, described as a 'little shooting pain' on the inside of her knee, which is currently the most painful area.    The condition was initially characterized by diffuse edema, described as lighting up 'like a Paras tree' on MRI, with edema throughout the knee. There was no specific injury or trauma, and it was noted to be a spontaneous occurrence.    Pain management has primarily been with ibuprofen, taken nightly for most of the duration of her symptoms, except for the last four or five nights. She has not used the prescribed tramadol.    Socially, she is very active, working as a hairdresser and managing a busy household  with three children, involving a lot of physical activity. She is on her feet all day and walks extensively during her children's sports activities, which may have contributed to her condition.          Past Medical History[1]  Past Surgical History[2]  Current Medications[3]  Allergies[4]  Family History[5]    Social History     Occupational History    Not on file   Tobacco Use    Smoking status: Never     Passive exposure: Never    Smokeless tobacco: Never    Tobacco comments:     No Household Smokers, Parents smoked during her childhood.    Vaping Use    Vaping status: Never Used   Substance and Sexual Activity    Alcohol use: Yes     Alcohol/week: 0.0 standard drinks of alcohol     Comment: wine, socially    Drug use: No     Comment: NONE    Sexual activity: Yes     Birth control/protection: OCP        Review of Systems:  GENERAL: denies fevers, chills, night sweats, fatigue, unintentional weight loss/gain  SKIN: denies skin lesions, open sores, rash  HEENT:denies recent vision change, new nasal congestion,hearing loss, tinnitus, sore throat, headaches  RESPIRATORY: denies new shortness of breath, cough, asthma, wheezing  CARDIOVASCULAR: denies chest pain, leg cramps with exertion, palpitations, leg swelling  GI: denies abdominal pain, nausea, vomiting, diarrhea, constipation, hematochezia, worsening heartburn or stomach ulcers  : denies dysuria, hematuria, incontinence, increased frequency, urgency, difficulty urinating  MUSCULOSKELETAL: denies musculoskeletal complaints other than in HPI  NEURO: denies numbness, tingling, weakness, balance issues, dizziness, memory loss  PSYCHIATRIC: denies Hx of depression, anxiety, other psychiatric disorders  HEMATOLOGIC: denies blood clots, anemia, blood clotting disorders, blood transfusion  ENDOCRINE: denies autoimmune disease, thyroid issues, or diabetes  ALLERGY: denies asthma, seasonal allergies    Physical Examination:    LMP 03/18/2025 (Approximate)     Physical  Exam  MUSCULOSKELETAL: No pain on palpation or deep palpation of the knee.    Constitutional: appears well hydrated, alert and responsive, no acute distress noted    Right Knee exam      Ambulates with mildly antalgic gait. Non tender to palpation over medial and lateral joint line. No significant pain with deep forced flexion.       Imaging:     Results  RADIOLOGY  Knee MRI: Diffuse edema, grade 2 insufficiency fracture, no avascular necrosis, appearance resembling a Paras tree pattern.      Imaging was independently reviewed and interpreted by attending physician  No results found.     Labs:  Lab Results   Component Value Date    WBC 8.4 02/01/2024    HGB 12.4 02/01/2024    .0 02/01/2024      Lab Results   Component Value Date    GLU 89 02/01/2024    BUN 12 02/01/2024    CREATSERUM 0.70 02/01/2024    GFRNAA 95 09/24/2020    GFRAA 110 09/24/2020        Assessment and Plan:  Assessment & Plan  Insufficiency fracture of knee, grade two  Confirmed by radiologist. Healing with reduced pain and improved mobility. Low recurrence risk with gradual weight bearing.  - Continue rest and gradual weight bearing as tolerated.  - Refer to [facility] for rehabilitation and strengthening exercises.  - Schedule follow-up in six weeks to monitor healing.  - Advise against high-impact activities until fully healed.    Diffuse edema of knee  Significant improvement, 80% better. Pain reduced to baseline. Edema likely due to grade two insufficiency fracture. AVN ruled out.  - Encourage gradual transition from non-weight bearing to partial weight bearing over four to six weeks.  - Refer to [facility] for supervised transition from crutches to full weight bearing.  - Advise follow-up in six weeks to assess progress.  - Encourage use of crutches for support as needed.  - Advise against sudden increase in activity.      There are no diagnoses linked to this encounter.      Follow Up: No follow-ups on  file.          Diego Carrero MD Orthopedic Surgery / Sports Medicine Specialist  [facility]  [address] [place] [address]  [email or web address]    t: [phone] f: [phone]    This note was dictated using Dragon software.  While it was briefly proofread prior to completion, some grammatical, spelling, and word choice errors due to dictation may still occur.       [1]   Past Medical History:   De Quervain's syndrome (tenosynovitis)    right hand - release,     Depression    Environmental allergies    Essential hypertension    Obesity, unspecified    TROY (obstructive sleep apnea)    AHI 5 Sao2 Sanjay 86%    Panic anxiety syndrome    Pregnancy (HCC)    Varicose veins   [2]   Past Surgical History:  Procedure Laterality Date      (9 hr. labor); (8 hr. labor);     Surgery - external Right     Wrist Tendon   [3]   Current Outpatient Medications   Medication Sig Dispense Refill    montelukast 10 MG Oral Tab Take 1 tablet (10 mg total) by mouth nightly. 90 tablet 2    levocetirizine 5 MG Oral Tab Take 1 tablet (5 mg total) by mouth nightly. 90 tablet 2    albuterol (PROAIR HFA) 108 (90 Base) MCG/ACT Inhalation Aero Soln Inhale 2 puffs into the lungs every 6 (six) hours as needed for Wheezing. 1 each 0    traMADol 50 MG Oral Tab Take 1 tablet (50 mg total) by mouth every 12 (twelve) hours as needed for Pain. 30 tablet 0    Na Sulfate-K Sulfate-Mg Sulf (SUPREP BOWEL PREP KIT) 17.5-3.13-1.6 GM/177ML Oral Solution Take as directed 1 each 0    hydroCHLOROthiazide 25 MG Oral Tab Take 1 tablet (25 mg total) by mouth daily. 90 tablet 0    Norethindrone (HAROON-BE) 0.35 MG Oral Tab Take 1 tablet (0.35 mg total) by mouth daily. 84 tablet 3    amLODIPine 5 MG Oral Tab Take 1 tablet (5 mg total) by mouth 2 (two) times daily. 180 tablet 1    Cholecalciferol (VITAMIN D3) 25 MCG (1000 UT) Oral Cap Take 1 tablet by mouth daily.     [4]   Allergies  Allergen Reactions    Azithromycin HIVES      Other reaction(s): Hives    Dog Epithelium OTHER (SEE COMMENTS)     Dogs and cats     Pollen      Other reaction(s): sneezing   [5]   Family History  Problem Relation Age of Onset    Other (Other) Mother         overweight    Lipids Father     Heart Disease Father         premature CAD - stent    Heart Disorder Father 57        coronary angioplasty    Carotid stenosis Father         endarterectomy    Hypertension Sister     Obesity Sister     Other (layton) Sister     Asthma Sister     Hypertension Sister     Hypertension Brother     Hypertension Brother     Other (Other) Brother         gout    Breast Cancer Maternal Grandmother 80    Diabetes Maternal Grandfather     Heart Disease Maternal Grandfather         CAD    Cancer Paternal Grandmother         splenic    Depression Maternal Uncle     Ovarian Cancer Neg     Pancreatic Cancer Neg     Prostate Cancer Neg

## 2025-08-11 ENCOUNTER — TELEPHONE (OUTPATIENT)
Dept: INTERNAL MEDICINE CLINIC | Facility: CLINIC | Age: 50
End: 2025-08-11

## 2025-08-24 DIAGNOSIS — I10 ESSENTIAL HYPERTENSION WITH GOAL BLOOD PRESSURE LESS THAN 140/90: ICD-10-CM

## 2025-08-25 DIAGNOSIS — I10 ESSENTIAL HYPERTENSION WITH GOAL BLOOD PRESSURE LESS THAN 140/90: ICD-10-CM

## 2025-08-25 RX ORDER — AMLODIPINE BESYLATE 5 MG/1
5 TABLET ORAL 2 TIMES DAILY
Qty: 180 TABLET | Refills: 1 | Status: CANCELLED | OUTPATIENT
Start: 2025-08-25

## 2025-08-28 RX ORDER — AMLODIPINE BESYLATE 5 MG/1
5 TABLET ORAL 2 TIMES DAILY
Qty: 180 TABLET | Refills: 0 | Status: SHIPPED | OUTPATIENT
Start: 2025-08-28

## (undated) NOTE — LETTER
05/07/18        [address] [facility]       Dear [place],    [address] [facility] records indicate that you have outstanding lab work and or testing that was ordered for you and has not yet been completed:          ABBEY MONTEIRO P

## (undated) NOTE — Clinical Note
Claire - I saw Marian today with mixed UI. I've recommended pessary and PFPT. I will work to manage her urinary sx. I appreciate the opportunity to participate in her care. Thanks, Irena

## (undated) NOTE — LETTER
1/25/2018              [address], [address] [facility]         Dear Henri Vinson,    It was a pleasure to see you. Your PAP and HPV testing was normal, next pap will be due in 3 years.  Return to the clinic in one year for

## (undated) NOTE — LETTER
5/6/2025          Guerline Iyer        [address] [facility] [address]         Dear Guerline,    This letter is to inform you that our office has made several attempts to reach you by phone without success.  We were attempting to contact you by phone regarding scheduling your colonoscopy.     Please contact our office at the number listed below as soon as you receive this letter to discuss this issue and to make the necessary changes in our system to your contact information.  Thank you for your cooperation.        Sincerely,      [facility], [facility], [address]  [facility] [id number] [phone]